# Patient Record
Sex: FEMALE | Race: WHITE | NOT HISPANIC OR LATINO | Employment: FULL TIME | URBAN - METROPOLITAN AREA
[De-identification: names, ages, dates, MRNs, and addresses within clinical notes are randomized per-mention and may not be internally consistent; named-entity substitution may affect disease eponyms.]

---

## 2019-05-01 ENCOUNTER — HOSPITAL ENCOUNTER (OUTPATIENT)
Dept: RADIOLOGY | Facility: MEDICAL CENTER | Age: 44
End: 2019-05-01
Attending: NURSE PRACTITIONER
Payer: COMMERCIAL

## 2019-05-01 ENCOUNTER — TELEPHONE (OUTPATIENT)
Dept: URGENT CARE | Facility: PHYSICIAN GROUP | Age: 44
End: 2019-05-01

## 2019-05-01 ENCOUNTER — OFFICE VISIT (OUTPATIENT)
Dept: URGENT CARE | Facility: PHYSICIAN GROUP | Age: 44
End: 2019-05-01
Payer: COMMERCIAL

## 2019-05-01 VITALS
DIASTOLIC BLOOD PRESSURE: 60 MMHG | RESPIRATION RATE: 16 BRPM | BODY MASS INDEX: 24.84 KG/M2 | HEART RATE: 105 BPM | SYSTOLIC BLOOD PRESSURE: 98 MMHG | TEMPERATURE: 99.1 F | OXYGEN SATURATION: 92 % | WEIGHT: 135 LBS | HEIGHT: 62 IN

## 2019-05-01 DIAGNOSIS — K59.03 DRUG-INDUCED CONSTIPATION: ICD-10-CM

## 2019-05-01 DIAGNOSIS — R10.12 LUQ ABDOMINAL PAIN: ICD-10-CM

## 2019-05-01 DIAGNOSIS — R10.12 LUQ ABDOMINAL PAIN: Primary | ICD-10-CM

## 2019-05-01 LAB
APPEARANCE UR: CLEAR
BILIRUB UR STRIP-MCNC: NORMAL MG/DL
COLOR UR AUTO: NORMAL
GLUCOSE UR STRIP.AUTO-MCNC: NORMAL MG/DL
KETONES UR STRIP.AUTO-MCNC: NORMAL MG/DL
LEUKOCYTE ESTERASE UR QL STRIP.AUTO: NORMAL
NITRITE UR QL STRIP.AUTO: NORMAL
PH UR STRIP.AUTO: 5.5 [PH] (ref 5–8)
PROT UR QL STRIP: NORMAL MG/DL
RBC UR QL AUTO: NORMAL
SP GR UR STRIP.AUTO: 1.01
UROBILINOGEN UR STRIP-MCNC: NORMAL MG/DL

## 2019-05-01 PROCEDURE — 81002 URINALYSIS NONAUTO W/O SCOPE: CPT | Performed by: NURSE PRACTITIONER

## 2019-05-01 PROCEDURE — 74176 CT ABD & PELVIS W/O CONTRAST: CPT

## 2019-05-01 PROCEDURE — 99204 OFFICE O/P NEW MOD 45 MIN: CPT | Performed by: NURSE PRACTITIONER

## 2019-05-01 RX ORDER — NITROFURANTOIN 25; 75 MG/1; MG/1
100 CAPSULE ORAL 2 TIMES DAILY
COMMUNITY
End: 2021-11-09

## 2019-05-01 RX ORDER — TRAZODONE HYDROCHLORIDE 50 MG/1
50 TABLET ORAL NIGHTLY
COMMUNITY
End: 2023-01-04

## 2019-05-01 NOTE — PROGRESS NOTES
"Subjective:      Shae Carrasco is a 43 y.o. female who presents with Abdominal Pain (5 days, severe abdominal pain, nausea, LUQ)            HPI  C/o LUQ abdominal pain. Bladder lift and cervix \"taken off\" in last week. Saw surgeon yesterday. Blood work ordered, due for f/u next week with him, no known results yet, labs went to [facility]. Taking Macrobid for UTI presently, placed on by surgeon. Experiencing no urinary symptoms . Nausea, vomiting before clinic visit today. Denies fever or malaise. Admits to taken narcotic from surgery but has stopped this. Has had bowel movement, no diarrhea.    PMH:  has a past medical history of Other specified symptom associated with female genital organs; Pain; and Psychiatric disorder.  MEDS:   Current Outpatient Prescriptions:   •  nitrofurantoin monohyd macro (MACROBID) 100 MG Cap, Take 100 mg by mouth 2 times a day., Disp: , Rfl:   •  ARIPiprazole (ABILIFY PO), Take  by mouth., Disp: , Rfl:   •  traZODone (DESYREL) 50 MG Tab, Take 50 mg by mouth every evening., Disp: , Rfl:   •  escitalopram (LEXAPRO) 10 MG Tab, Take 1 Tab by mouth every day., Disp: 30 Tab, Rfl: 0  •  lamotrigine (LAMICTAL) 25 MG Tab, Take 2 Tabs by mouth every day., Disp: 60 Tab, Rfl: 0  •  ibuprofen (MOTRIN) 600 MG TABS, Take 600 mg by mouth every 6 hours as needed., Disp: , Rfl:   •  Albuterol (VENTOLIN INH), Inhale  by mouth., Disp: , Rfl:   ALLERGIES: No Known Allergies  SURGHX:   Past Surgical History:   Procedure Laterality Date   • SUPRACERVICAL HYSTERECTOMY SCOPE  6/8/2011    Performed by KLAUDIA DREW at SURGERY SAME DAY [facility] ORS   • DILATION AND CURETTAGE  2004     SOCHX:  reports that she has quit smoking. Her smoking use included Cigarettes. She has a 13.00 pack-year smoking history. She has never used smokeless tobacco. She reports that she does not drink alcohol or use drugs.  FH: Family history was reviewed, no pertinent findings to report    Review of Systems   Constitutional: " "Negative for chills, fever and malaise/fatigue.   Respiratory: Negative for shortness of breath.    Cardiovascular: Negative for chest pain, palpitations and orthopnea.   Gastrointestinal: Positive for abdominal pain, nausea and vomiting. Negative for constipation and diarrhea.   Genitourinary: Negative for dysuria, flank pain, frequency, hematuria and urgency.   Musculoskeletal: Negative for back pain and myalgias.   Neurological: Negative for dizziness, weakness and headaches.   All other systems reviewed and are negative.         Objective:     BP (!) 98/60   Pulse (!) 105   Temp 37.3 °C (99.1 °F) (Temporal)   Resp 16   Ht 1.575 m (5' 2\")   Wt 61.2 kg (135 lb)   LMP 12/01/2010   SpO2 92%   BMI 24.69 kg/m²      Physical Exam   Constitutional: She is oriented to person, place, and time. She appears well-developed and well-nourished. She is active and cooperative.  Non-toxic appearance. She does not have a sickly appearance. She does not appear ill. No distress.   HENT:   Head: Normocephalic.   Eyes: Pupils are equal, round, and reactive to light. Conjunctivae and EOM are normal.   Neck: Normal range of motion. Neck supple.   Cardiovascular: Normal rate, regular rhythm and normal heart sounds.    Pulmonary/Chest: Effort normal and breath sounds normal. No accessory muscle usage. No respiratory distress. She has no decreased breath sounds. She has no wheezes. She has no rhonchi. She has no rales.   Abdominal: Soft. Normal appearance and bowel sounds are normal. She exhibits no distension, no fluid wave, no ascites and no mass. There is no hepatosplenomegaly. There is tenderness in the left upper quadrant and left lower quadrant. There is no rigidity, no rebound, no guarding, no CVA tenderness, no tenderness at McBurney's point and negative Mina's sign. No hernia.       Musculoskeletal: Normal range of motion.   Neurological: She is alert and oriented to person, place, and time. She has normal strength. No " cranial nerve deficit or sensory deficit. Coordination and gait normal. GCS eye subscore is 4. GCS verbal subscore is 5. GCS motor subscore is 6.   Skin: Skin is warm and dry. She is not diaphoretic.   Psychiatric: She has a normal mood and affect. Her speech is normal and behavior is normal. Judgment and thought content normal. She is not actively hallucinating. Cognition and memory are normal. She is attentive.   Vitals reviewed.              Assessment/Plan:     1. LUQ abdominal pain    - CT-RENAL COLIC EVALUATION(A/P W/O); Future  - POCT Urinalysis: bld (already taking Macrobid)    2. Drug-induced constipation    Increase water intake  Recommend daily stool softener with laxative prn like Miralax  Encourage fibrous food intake and balanced diet including fruits and vegetables, decrease sugar and processed foods  May introduce a fiber supplement daily  Monitor for severe abdominal pain with fever and inability to have bowel movement, leslie blood with bowel movement, fever, n/v- need to got to ER, patient understands this  F/u surgeon next week

## 2019-05-04 ASSESSMENT — ENCOUNTER SYMPTOMS
PALPITATIONS: 0
BACK PAIN: 0
CONSTIPATION: 0
HEADACHES: 0
MYALGIAS: 0
NAUSEA: 1
FLANK PAIN: 0
FEVER: 0
CHILLS: 0
VOMITING: 1
DIZZINESS: 0
SHORTNESS OF BREATH: 0
ORTHOPNEA: 0
WEAKNESS: 0
ABDOMINAL PAIN: 1
DIARRHEA: 0

## 2020-10-19 ENCOUNTER — APPOINTMENT (OUTPATIENT)
Dept: URGENT CARE | Facility: PHYSICIAN GROUP | Age: 45
End: 2020-10-19
Payer: COMMERCIAL

## 2021-10-25 ENCOUNTER — TELEPHONE (OUTPATIENT)
Dept: BEHAVIORAL HEALTH | Facility: PSYCHIATRIC FACILITY | Age: 46
End: 2021-10-25

## 2021-10-25 NOTE — TELEPHONE ENCOUNTER
Received request via: Pharmacy    Was the patient seen in the last year in this department? No     Does the patient have an active prescription (recently filled or refills available) for medication(s) requested? No     Pharmacy faxed over refill request for metformin 1000 MG. Pharmacy is [facility] on [address].

## 2021-11-09 ENCOUNTER — OFFICE VISIT (OUTPATIENT)
Dept: BEHAVIORAL HEALTH | Facility: PSYCHIATRIC FACILITY | Age: 46
End: 2021-11-09
Payer: COMMERCIAL

## 2021-11-09 VITALS
HEART RATE: 78 BPM | DIASTOLIC BLOOD PRESSURE: 76 MMHG | BODY MASS INDEX: 26.23 KG/M2 | SYSTOLIC BLOOD PRESSURE: 99 MMHG | WEIGHT: 143.4 LBS

## 2021-11-09 DIAGNOSIS — F31.9 BIPOLAR AFFECTIVE DISORDER, REMISSION STATUS UNSPECIFIED (HCC): ICD-10-CM

## 2021-11-09 DIAGNOSIS — Z00.00 HEALTH CARE MAINTENANCE: Primary | ICD-10-CM

## 2021-11-09 PROCEDURE — 99213 OFFICE O/P EST LOW 20 MIN: CPT | Mod: GE | Performed by: STUDENT IN AN ORGANIZED HEALTH CARE EDUCATION/TRAINING PROGRAM

## 2021-11-09 RX ORDER — ARIPIPRAZOLE 10 MG/1
TABLET ORAL
COMMUNITY
Start: 2021-11-05 | End: 2021-12-14 | Stop reason: SDUPTHER

## 2021-11-09 RX ORDER — ALBUTEROL SULFATE 90 UG/1
AEROSOL, METERED RESPIRATORY (INHALATION)
COMMUNITY

## 2021-11-09 RX ORDER — LAMOTRIGINE 25 MG/1
3 TABLET ORAL
COMMUNITY
End: 2022-03-22 | Stop reason: SDUPTHER

## 2021-11-09 RX ORDER — LAMOTRIGINE 100 MG/1
TABLET ORAL
COMMUNITY
Start: 2021-10-20 | End: 2022-03-22 | Stop reason: SDUPTHER

## 2021-11-09 RX ORDER — ARIPIPRAZOLE 5 MG/1
TABLET ORAL
COMMUNITY
Start: 2021-10-17 | End: 2021-11-10

## 2021-11-09 RX ORDER — ESCITALOPRAM OXALATE 20 MG/1
1 TABLET ORAL
COMMUNITY
End: 2021-11-09

## 2021-11-09 RX ORDER — BUPROPION HYDROCHLORIDE 150 MG/1
150 TABLET ORAL EVERY MORNING
Qty: 90 TABLET | Refills: 1 | Status: SHIPPED | OUTPATIENT
Start: 2021-11-09 | End: 2021-12-14
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
## 2021-11-10 RX ORDER — ARIPIPRAZOLE 5 MG/1
TABLET ORAL
Qty: 15 TABLET | Refills: 2 | Status: SHIPPED | OUTPATIENT
Start: 2021-11-10 | End: 2021-12-14 | Stop reason: SDUPTHER

## 2021-12-14 ENCOUNTER — OFFICE VISIT (OUTPATIENT)
Dept: BEHAVIORAL HEALTH | Facility: PSYCHIATRIC FACILITY | Age: 46
End: 2021-12-14
Payer: COMMERCIAL

## 2021-12-14 DIAGNOSIS — Z00.00 HEALTHCARE MAINTENANCE: Primary | ICD-10-CM

## 2021-12-14 DIAGNOSIS — F41.1 GENERALIZED ANXIETY DISORDER: ICD-10-CM

## 2021-12-14 DIAGNOSIS — F31.9 BIPOLAR AFFECTIVE DISORDER, REMISSION STATUS UNSPECIFIED (HCC): ICD-10-CM

## 2021-12-14 PROCEDURE — 99213 OFFICE O/P EST LOW 20 MIN: CPT | Mod: GE | Performed by: STUDENT IN AN ORGANIZED HEALTH CARE EDUCATION/TRAINING PROGRAM

## 2021-12-14 RX ORDER — BUPROPION HYDROCHLORIDE 150 MG/1
150 TABLET ORAL EVERY MORNING
Qty: 30 TABLET | Refills: 2 | Status: SHIPPED | OUTPATIENT
Start: 2021-12-14 | End: 2022-01-18

## 2021-12-14 RX ORDER — ARIPIPRAZOLE 5 MG/1
2.5 TABLET ORAL DAILY
Qty: 15 TABLET | Refills: 2 | Status: SHIPPED | OUTPATIENT
Start: 2021-12-14 | End: 2022-03-14

## 2021-12-14 RX ORDER — ARIPIPRAZOLE 10 MG/1
10 TABLET ORAL DAILY
Qty: 30 TABLET | Refills: 2 | Status: SHIPPED | OUTPATIENT
Start: 2021-12-14 | End: 2022-03-22
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
## 2022-01-18 ENCOUNTER — TELEMEDICINE (OUTPATIENT)
Dept: BEHAVIORAL HEALTH | Facility: PSYCHIATRIC FACILITY | Age: 47
End: 2022-01-18
Payer: COMMERCIAL

## 2022-01-18 DIAGNOSIS — F31.9 BIPOLAR AFFECTIVE DISORDER, REMISSION STATUS UNSPECIFIED (HCC): ICD-10-CM

## 2022-01-18 DIAGNOSIS — F41.1 GENERALIZED ANXIETY DISORDER: ICD-10-CM

## 2022-01-18 PROCEDURE — 99213 OFFICE O/P EST LOW 20 MIN: CPT | Mod: GT,GE | Performed by: STUDENT IN AN ORGANIZED HEALTH CARE EDUCATION/TRAINING PROGRAM

## 2022-01-18 RX ORDER — BUPROPION HYDROCHLORIDE 300 MG/1
300 TABLET ORAL EVERY MORNING
Qty: 30 TABLET | Refills: 2 | Status: SHIPPED | OUTPATIENT
Start: 2022-01-18 | End: 2022-04-12
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
## 2022-01-19 ASSESSMENT — ENCOUNTER SYMPTOMS
WEAKNESS: 0
SPUTUM PRODUCTION: 0
FEVER: 0
DIARRHEA: 0
DIZZINESS: 0
PALPITATIONS: 0
FLANK PAIN: 0
VOMITING: 0
FALLS: 0
DOUBLE VISION: 0
BRUISES/BLEEDS EASILY: 0
ORTHOPNEA: 0
HEADACHES: 0
HEMOPTYSIS: 0
BLURRED VISION: 0
COUGH: 0
HEARTBURN: 0
MYALGIAS: 0
PHOTOPHOBIA: 0
NAUSEA: 0
CHILLS: 0

## 2022-03-22 ENCOUNTER — OFFICE VISIT (OUTPATIENT)
Dept: BEHAVIORAL HEALTH | Facility: PSYCHIATRIC FACILITY | Age: 47
End: 2022-03-22
Payer: COMMERCIAL

## 2022-03-22 DIAGNOSIS — F31.9 BIPOLAR AFFECTIVE DISORDER, REMISSION STATUS UNSPECIFIED (HCC): ICD-10-CM

## 2022-03-22 DIAGNOSIS — F41.1 GENERALIZED ANXIETY DISORDER: ICD-10-CM

## 2022-03-22 PROCEDURE — 99214 OFFICE O/P EST MOD 30 MIN: CPT | Performed by: STUDENT IN AN ORGANIZED HEALTH CARE EDUCATION/TRAINING PROGRAM

## 2022-03-22 RX ORDER — LAMOTRIGINE 25 MG/1
25 TABLET ORAL DAILY
Qty: 30 TABLET | Refills: 3 | Status: SHIPPED | OUTPATIENT
Start: 2022-03-22 | End: 2022-05-24 | Stop reason: SDUPTHER

## 2022-03-22 RX ORDER — TRAZODONE HYDROCHLORIDE 50 MG/1
50 TABLET ORAL NIGHTLY PRN
Qty: 30 TABLET | Refills: 3 | Status: SHIPPED | OUTPATIENT
Start: 2022-03-22 | End: 2022-07-20 | Stop reason: SDUPTHER

## 2022-03-22 RX ORDER — ARIPIPRAZOLE 10 MG/1
10 TABLET ORAL DAILY
Qty: 30 TABLET | Refills: 3 | Status: SHIPPED | OUTPATIENT
Start: 2022-03-22 | End: 2022-07-20 | Stop reason: SDUPTHER

## 2022-03-22 RX ORDER — LAMOTRIGINE 100 MG/1
100 TABLET ORAL DAILY
Qty: 30 TABLET | Refills: 3 | Status: SHIPPED | OUTPATIENT
Start: 2022-03-22 | End: 2022-05-24 | Stop reason: SDUPTHER

## 2022-03-22 RX ORDER — ARIPIPRAZOLE 5 MG/1
2.5 TABLET ORAL DAILY
Qty: 30 TABLET | Refills: 3 | Status: SHIPPED | OUTPATIENT
Start: 2022-03-22 | End: 2022-07-20 | Stop reason: SDUPTHER
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
## 2022-04-12 RX ORDER — BUPROPION HYDROCHLORIDE 300 MG/1
TABLET ORAL
Qty: 30 TABLET | Refills: 2 | Status: SHIPPED | OUTPATIENT
Start: 2022-04-12 | End: 2022-05-24

## 2022-05-24 ENCOUNTER — OFFICE VISIT (OUTPATIENT)
Dept: BEHAVIORAL HEALTH | Facility: PSYCHIATRIC FACILITY | Age: 47
End: 2022-05-24
Payer: COMMERCIAL

## 2022-05-24 DIAGNOSIS — F41.1 GENERALIZED ANXIETY DISORDER: ICD-10-CM

## 2022-05-24 DIAGNOSIS — F31.9 BIPOLAR AFFECTIVE DISORDER, REMISSION STATUS UNSPECIFIED (HCC): ICD-10-CM

## 2022-05-24 PROCEDURE — 99213 OFFICE O/P EST LOW 20 MIN: CPT | Mod: GE | Performed by: STUDENT IN AN ORGANIZED HEALTH CARE EDUCATION/TRAINING PROGRAM

## 2022-05-24 RX ORDER — BUPROPION HYDROCHLORIDE 150 MG/1
150 TABLET ORAL EVERY MORNING
Qty: 30 TABLET | Refills: 3 | Status: SHIPPED | OUTPATIENT
Start: 2022-05-24 | End: 2022-06-21

## 2022-05-24 RX ORDER — LAMOTRIGINE 25 MG/1
25 TABLET ORAL DAILY
Qty: 30 TABLET | Refills: 3 | Status: SHIPPED | OUTPATIENT
Start: 2022-05-24 | End: 2022-07-20 | Stop reason: SDUPTHER

## 2022-05-24 RX ORDER — LAMOTRIGINE 100 MG/1
100 TABLET ORAL DAILY
Qty: 30 TABLET | Refills: 3 | Status: SHIPPED | OUTPATIENT
Start: 2022-05-24 | End: 2022-07-20 | Stop reason: SDUPTHER
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
## 2022-06-21 ENCOUNTER — OFFICE VISIT (OUTPATIENT)
Dept: BEHAVIORAL HEALTH | Facility: PSYCHIATRIC FACILITY | Age: 47
End: 2022-06-21
Payer: COMMERCIAL

## 2022-06-21 VITALS
BODY MASS INDEX: 27.42 KG/M2 | HEIGHT: 62 IN | WEIGHT: 149 LBS | SYSTOLIC BLOOD PRESSURE: 118 MMHG | HEART RATE: 89 BPM | DIASTOLIC BLOOD PRESSURE: 72 MMHG

## 2022-06-21 DIAGNOSIS — F41.1 GENERALIZED ANXIETY DISORDER: ICD-10-CM

## 2022-06-21 DIAGNOSIS — F31.9 BIPOLAR AFFECTIVE DISORDER, REMISSION STATUS UNSPECIFIED (HCC): ICD-10-CM

## 2022-06-21 PROCEDURE — 99213 OFFICE O/P EST LOW 20 MIN: CPT | Mod: GE | Performed by: STUDENT IN AN ORGANIZED HEALTH CARE EDUCATION/TRAINING PROGRAM

## 2022-06-21 RX ORDER — BUPROPION HYDROCHLORIDE 150 MG/1
150 TABLET ORAL EVERY MORNING
Qty: 30 TABLET | Status: CANCELLED | OUTPATIENT
Start: 2022-06-21

## 2022-06-21 RX ORDER — ESCITALOPRAM OXALATE 20 MG/1
20 TABLET ORAL DAILY
Qty: 30 TABLET | Refills: 3 | Status: SHIPPED | OUTPATIENT
Start: 2022-06-21 | End: 2022-07-20 | Stop reason: SDUPTHER

## 2022-06-21 RX ORDER — BUPROPION HYDROCHLORIDE 75 MG/1
75 TABLET ORAL DAILY
Qty: 30 TABLET | Refills: 3 | Status: SHIPPED | OUTPATIENT
Start: 2022-06-21 | End: 2022-07-20 | Stop reason: SDUPTHER
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
## 2022-07-13 ENCOUNTER — APPOINTMENT (OUTPATIENT)
Dept: BEHAVIORAL HEALTH | Facility: PSYCHIATRIC FACILITY | Age: 47
End: 2022-07-13
Payer: COMMERCIAL

## 2022-07-20 RX ORDER — TRAZODONE HYDROCHLORIDE 50 MG/1
50 TABLET ORAL NIGHTLY PRN
Qty: 30 TABLET | Refills: 3 | Status: SHIPPED | OUTPATIENT
Start: 2022-07-20 | End: 2023-01-04 | Stop reason: SDUPTHER

## 2022-07-20 RX ORDER — BUPROPION HYDROCHLORIDE 75 MG/1
75 TABLET ORAL DAILY
Qty: 30 TABLET | Refills: 3 | Status: SHIPPED | OUTPATIENT
Start: 2022-07-20 | End: 2022-11-02 | Stop reason: SDUPTHER

## 2022-07-20 RX ORDER — ARIPIPRAZOLE 10 MG/1
10 TABLET ORAL DAILY
Qty: 30 TABLET | Refills: 3 | Status: SHIPPED | OUTPATIENT
Start: 2022-07-20 | End: 2022-11-02 | Stop reason: SDUPTHER

## 2022-07-20 RX ORDER — LAMOTRIGINE 100 MG/1
100 TABLET ORAL DAILY
Qty: 30 TABLET | Refills: 3 | Status: SHIPPED | OUTPATIENT
Start: 2022-07-20 | End: 2022-11-02 | Stop reason: SDUPTHER

## 2022-07-20 RX ORDER — LAMOTRIGINE 25 MG/1
25 TABLET ORAL DAILY
Qty: 30 TABLET | Refills: 3 | Status: SHIPPED | OUTPATIENT
Start: 2022-07-20 | End: 2022-11-02 | Stop reason: SDUPTHER

## 2022-07-20 RX ORDER — ARIPIPRAZOLE 5 MG/1
2.5 TABLET ORAL DAILY
Qty: 30 TABLET | Refills: 3 | Status: SHIPPED | OUTPATIENT
Start: 2022-07-20 | End: 2022-11-02 | Stop reason: SDUPTHER

## 2022-07-20 RX ORDER — ESCITALOPRAM OXALATE 20 MG/1
20 TABLET ORAL DAILY
Qty: 30 TABLET | Refills: 3 | Status: SHIPPED | OUTPATIENT
Start: 2022-07-20 | End: 2022-11-02

## 2022-08-24 ENCOUNTER — OFFICE VISIT (OUTPATIENT)
Dept: BEHAVIORAL HEALTH | Facility: PSYCHIATRIC FACILITY | Age: 47
End: 2022-08-24
Payer: COMMERCIAL

## 2022-08-24 DIAGNOSIS — F41.1 GENERALIZED ANXIETY DISORDER: ICD-10-CM

## 2022-08-24 DIAGNOSIS — F31.9 BIPOLAR AFFECTIVE DISORDER, REMISSION STATUS UNSPECIFIED (HCC): ICD-10-CM

## 2022-08-24 PROCEDURE — 99213 OFFICE O/P EST LOW 20 MIN: CPT | Mod: GE | Performed by: STUDENT IN AN ORGANIZED HEALTH CARE EDUCATION/TRAINING PROGRAM

## 2022-08-24 ASSESSMENT — ENCOUNTER SYMPTOMS
HOPELESSNESS: 0
FREQUENT AWAKENING: 0
HIGH ENERGY LEVEL: 0
DECREASED NEED FOR SLEEP: 0
DECREASED APPETITE: 0
PANIC: 0
COMPULSIONS: 0
DELUSIONS: 0
TRAUMA MANIFESTATION - EXHIBITS INCREASED AROUSAL: 0
PARANOIA: 0
DIFFICULTY FALLING ASLEEP: 0

## 2022-08-24 ASSESSMENT — SOCIAL DETERMINANTS OF HEALTH (SDOH): ANXIETY ASSOCIATED WITH SOCIAL SUPPORT SYSTEM: 0
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
## 2022-10-05 ENCOUNTER — OFFICE VISIT (OUTPATIENT)
Dept: BEHAVIORAL HEALTH | Facility: PSYCHIATRIC FACILITY | Age: 47
End: 2022-10-05
Payer: COMMERCIAL

## 2022-10-05 VITALS
BODY MASS INDEX: 31.1 KG/M2 | HEIGHT: 62 IN | WEIGHT: 169 LBS | DIASTOLIC BLOOD PRESSURE: 69 MMHG | SYSTOLIC BLOOD PRESSURE: 124 MMHG | OXYGEN SATURATION: 97 % | HEART RATE: 231 BPM

## 2022-10-05 DIAGNOSIS — F31.9 BIPOLAR AFFECTIVE DISORDER, REMISSION STATUS UNSPECIFIED (HCC): ICD-10-CM

## 2022-10-05 PROCEDURE — 99213 OFFICE O/P EST LOW 20 MIN: CPT | Mod: GE | Performed by: STUDENT IN AN ORGANIZED HEALTH CARE EDUCATION/TRAINING PROGRAM

## 2022-10-05 ASSESSMENT — ENCOUNTER SYMPTOMS
DIFFICULTY FALLING ASLEEP: 0
COMPULSIONS: 0
DELUSIONS: 0
HIGH ENERGY LEVEL: 0
DECREASED NEED FOR SLEEP: 0
TRAUMA MANIFESTATION - EXHIBITS INCREASED AROUSAL: 0
WEIGHT GAIN: 1
DECREASED APPETITE: 0
PANIC: 0
HOPELESSNESS: 0
FREQUENT AWAKENING: 0
PARANOIA: 0

## 2022-10-05 ASSESSMENT — SOCIAL DETERMINANTS OF HEALTH (SDOH): ANXIETY ASSOCIATED WITH SOCIAL SUPPORT SYSTEM: 0
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
## 2022-11-02 ENCOUNTER — OFFICE VISIT (OUTPATIENT)
Dept: BEHAVIORAL HEALTH | Facility: PSYCHIATRIC FACILITY | Age: 47
End: 2022-11-02
Payer: COMMERCIAL

## 2022-11-02 DIAGNOSIS — F31.9 BIPOLAR AFFECTIVE DISORDER, REMISSION STATUS UNSPECIFIED (HCC): ICD-10-CM

## 2022-11-02 PROCEDURE — 99213 OFFICE O/P EST LOW 20 MIN: CPT | Mod: GE | Performed by: STUDENT IN AN ORGANIZED HEALTH CARE EDUCATION/TRAINING PROGRAM

## 2022-11-02 RX ORDER — LAMOTRIGINE 100 MG/1
100 TABLET ORAL DAILY
Qty: 30 TABLET | Refills: 3 | Status: SHIPPED | OUTPATIENT
Start: 2022-11-02 | End: 2023-01-04 | Stop reason: SDUPTHER

## 2022-11-02 RX ORDER — ARIPIPRAZOLE 10 MG/1
10 TABLET ORAL DAILY
Qty: 30 TABLET | Refills: 3 | Status: SHIPPED | OUTPATIENT
Start: 2022-11-02 | End: 2023-01-04 | Stop reason: SDUPTHER

## 2022-11-02 RX ORDER — ARIPIPRAZOLE 5 MG/1
2.5 TABLET ORAL DAILY
Qty: 30 TABLET | Refills: 3 | Status: SHIPPED | OUTPATIENT
Start: 2022-11-02 | End: 2023-01-04 | Stop reason: SDUPTHER

## 2022-11-02 RX ORDER — BUPROPION HYDROCHLORIDE 75 MG/1
75 TABLET ORAL DAILY
Qty: 30 TABLET | Refills: 3 | Status: SHIPPED | OUTPATIENT
Start: 2022-11-02 | End: 2023-01-04 | Stop reason: SDUPTHER

## 2022-11-02 RX ORDER — LAMOTRIGINE 25 MG/1
25 TABLET ORAL DAILY
Qty: 30 TABLET | Refills: 3 | Status: SHIPPED | OUTPATIENT
Start: 2022-11-02 | End: 2023-01-04 | Stop reason: SDUPTHER

## 2022-11-02 ASSESSMENT — ENCOUNTER SYMPTOMS
TRAUMA MANIFESTATION - EXHIBITS INCREASED AROUSAL: 0
WEAKNESS: 0
VOMITING: 0
DECREASED APPETITE: 0
DECREASED NEED FOR SLEEP: 0
FEVER: 0
HIGH ENERGY LEVEL: 0
HEADACHES: 0
HOPELESSNESS: 0
NERVOUS/ANXIOUS: 0
BLURRED VISION: 0
COUGH: 0
DELUSIONS: 0
PARANOIA: 0
DOUBLE VISION: 0
TREMORS: 1
COMPULSIONS: 0
SEIZURES: 0
MYALGIAS: 0
DIZZINESS: 0
NAUSEA: 0
DEPRESSION: 0
WEIGHT GAIN: 1
CHILLS: 0
SENSORY CHANGE: 0
FREQUENT AWAKENING: 0
PANIC: 0
HEARTBURN: 0
TINGLING: 1
DIFFICULTY FALLING ASLEEP: 0
HALLUCINATIONS: 0

## 2022-11-02 ASSESSMENT — SOCIAL DETERMINANTS OF HEALTH (SDOH): ANXIETY ASSOCIATED WITH SOCIAL SUPPORT SYSTEM: 0

## 2022-11-02 NOTE — PROGRESS NOTES
[facility]  Medication Management Note    Evaluation completed by: Saige Payne D.O.  Date of Service: 11/2/22   Appointment type: in-office appointment.  Information below was collected from: patient  Special language or communication needs: No  Responded to any questions about patient rights: Yes  Reviewed limits of confidentiality: Yes  Confidentiality: The patient was informed that her medical records are confidential except for use by the treatment team in this clinic and others involved in her care.  Records may be shared with outside entities if the patient signs a release of information.  Information may be shared with appropriate authorities without a release of information to report instances of child/elder abuse or if it is determined she is in imminent risk of harm to self or others.     CHIEF COMPLAINT/REASON FOR VISIT  Medication Follow Up    HISTORY OF PRESENT ILLNESS  Shae Carrasco is a 46 y.o. old female who presents today with history of bipolar disorder type 1 and anxiety.  Pt was last seen last month at which time Wellbutrin XL 75mg (which was decreased and continued at decreased dose), abilify 12.5mg, lamotrigine 125mg, and trazodone 50mg QHS PRN. Patient was previously struggling with increased anxiety, now reports that has been gone since decreasing the Wellbutrin.  Patient was previously dealing with sexual side effects of being unable to reach climax/delayed climax from the Lexapro, she decided to self wean off the medication - stated that she cut in half (10 mg to 5 mg) for 2 weeks and then cut it in a quarter (10 mg to 2.5 mg) for a week.  She has not experienced any withdrawal side effects.    She reports continued bilateral mild hand tremors about every other day for the past couple of weeks when she wakes up that is unrelated to anxiety, and she usually notices it first in the morning and seldomly in the afternoon 1-2x/wk since 9/22. She denies  "any other associated symptoms sx such as pain, headache, trauma, head tremor.  She forgot to mention last appointment that she has had chronic numbness and tingling. She is unaware of family hx due to being adopted. She would like to remain on her medications currently and is planning on making an appointment with her PCP (Dr. Dias, independent [facility]) and potential.    Bipolar disorder type I  -Denies symptoms of jose including decreased need for sleep (sleeping 8 hours), impulsivity, distractibility, grandiosity, increased risk-taking behaviors, racing thoughts  -Denies paranoia (previously was very paranoid about  cheating and boss was going to fire her)  -Last manic episode was in early 2000's  -Denies AH/VH  -Denies all suicidal or homicidal ideation  -Reports positive mood with good stability  -Reports good sleep  -Denies symptoms of akathisia    Anxiety  -Reports that she no longer has anxiety after decreasing Wellbutrin XL from 150 mg to 75 mg, and that it is helps with distractibility   -Denies panic attacks        PSYCHOSOCIAL CHANGES SINCE LAST VISIT   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 They have had a successful grand opening which she is very relieved about as they invested a lot of money into the business.            CURRENT MEDICATIONS, ADHERENCE, AND SIDE EFFECTS   Wellbutrin XL 75 mg p.o. QAM daily-good adherence, no longer experiencing increased anxiety  Lexapro 10mg QAM daily-good adherence, less of a problem experiencing organism after adding Wellbutrin (patient weaned off-see HPI)    Abilify 12.5 mg p.o. QAM daily-good adherence, weight gain  Lamictal 125 mg p.o. QHS daily- good adherence, sleepiness    Trazodone 50 mg p.o. as needed nightly (rarely uses)    Metformin 1000 mg twice daily-good adherence, no side effects    PSYCHIATRIC REVIEW OF SYSTEMS:      MOOD SYMPTOMS:   Pertinent " negatives - not sad, no mood swings, not irritable, no mood changes, no grandiosity and not apathetic      ANXIETY:   Pertinent positives - depression/anxiety affecting progress    Pertinent negatives - no excessive worry, no anxiety and no panic    SLEEP:   Pertinent negatives - no difficulty falling asleep and no decreased need for sleep     PSYCHOMOTOR/ENERGY:   Pertinent negatives - not hypoactive, energy level not high, does not exhibit increased arousal and does not exhibit intense distress when reminded of event    EATING:   Pertinent positives: increased appetite and weight gain    Pertinent negatives: no decreased appetite    COGNITIVE:   Pertinent negatives: no obsessions, no compulsions and no hopelessness     BEHAVIOR:   Pertinent negatives - no risky behavior    PSYCHOSIS:   Pertinent negatives - auditory hallucinations, visual hallucinations, delusions and paranoia  SOCIAL:   Pertinent negatives - no history of withdrawal symptoms    SENSORY:            MEDICAL REVIEW OF SYSTEMS  Review of Systems   Constitutional:  Positive for weight gain. Negative for chills, decreased appetite and fever.   HENT:  Negative for hearing loss.    Eyes:  Negative for blurred vision and double vision.   Respiratory:  Negative for cough.    Cardiovascular:  Negative for chest pain.   Gastrointestinal:  Negative for heartburn, nausea and vomiting.   Musculoskeletal:  Negative for myalgias.   Neurological:  Positive for tingling and tremors. Negative for dizziness, sensory change, seizures, weakness and headaches.   Psychiatric/Behavioral:  Negative for depression, hallucinations, paranoia and suicidal ideas. The patient is not nervous/anxious.         CURRENT MEDICATIONS  Current Outpatient Medications on File Prior to Visit   Medication Sig Dispense Refill    metformin (GLUCOPHAGE) 1000 MG tablet TAKE 1 TABLET BY MOUTH TWICE A DAY 60 Tablet 2    escitalopram (LEXAPRO) 20 MG tablet Take 1 Tablet by mouth every day. Take  1/2 tab daily for 10 days. Take 1 full tab daily thereafter. 30 Tablet 3    buPROPion (WELLBUTRIN) 75 MG Tab Take 1 Tablet by mouth every day. 30 Tablet 3    lamoTRIgine (LAMICTAL) 100 MG Tab Take 1 Tablet by mouth every day. 30 Tablet 3    lamoTRIgine (LAMICTAL) 25 MG Tab Take 1 Tablet by mouth every day. 30 Tablet 3    ARIPiprazole (ABILIFY) 10 MG Tab Take 1 Tablet by mouth every day. 30 Tablet 3    ARIPiprazole (ABILIFY) 5 MG tablet Take 0.5 Tablets by mouth every day. 30 Tablet 3    traZODone (DESYREL) 50 MG Tab Take 1 Tablet by mouth at bedtime as needed for Sleep (as needed for sleep). Take 1/2 to 1 tablet by mouth at bedtime. 30 Tablet 3    albuterol 108 (90 Base) MCG/ACT Aero Soln inhalation aerosol inhale 2 puff by inhalation route  every 4 - 6 hours as needed as needed      traZODone (DESYREL) 50 MG Tab Take 50 mg by mouth every evening.      ibuprofen (MOTRIN) 600 MG TABS Take 600 mg by mouth every 6 hours as needed.      Albuterol (VENTOLIN INH) Inhale  by mouth.       No current facility-administered medications on file prior to visit.        ALLERGIES  No Known Allergies     PAST PSYCHIATRIC HISTORY  -Diagnosed with bipolar I in the early 2000's after having manic symptoms consisting of poor sleep, distractibility, unusual behaviors (excessive shopping, hypersexuality, picking fights with ) and racing thoughts for 4-7 days. Potentially would happen once a month for about a year before seeing a psychiatrist - she believes that this started occurring after she started getting clean and sober from drug abuse in which her and her  decided to get clean simultaneously together.     -Hospitalizations: 1x in [place], 20 years ago    -Suicide Attempts: multiple, most recent 3 years ago, usually OD/cuts     -Outpatient Psychiatrist: 3-4 years in this clinic   -Therapist: none     -Past Medications/Therapies:   Depakote - made legs shake   Been on Abilify since early 2000's  Primary care physician added  "Lamictal 2022  Dr. 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 She is not aware a family history or psychiatric illness or suicide attempt.  Pt is aware but not in touch with biological mom and dad, but to her knowledge they do not have any.    FAMILY MEDICAL HISTORY  Patient is adopted, no known family medical history    PHYSICAL EXAMINATION  Vital signs: LMP 12/01/2010 , WT: 149 lbs (144 last visit)  Musculoskeletal: Gait is normal. No gross abnormalities noted.   Abnormal movements: None    MENTAL STATUS EXAMINATION    General: Shae Carrasco appears stated age and exhibits grooming which is appropriate, casual and neat.  Hygiene is appropriate. Pt has sleeve tattoos   Behavior: Pt is calm and cooperative with interview.  No apparent distress.  Eye contact is appropriate.   Psychomotor: Psychomotor agitation or retardation not noted.  Tics or tremors not noted.  Speech: rate within normal limits  Language: Fluent in English  Mood: \"Doing really good\"  Affect: Congruent with content and Anxious,  Thought Process: " Logical and Goal-directed  Thought Content: denies suicidal ideation, denies homicidal ideation. Within normal limits  Perception: denies auditory hallucinations, denies visual hallucinations. No delusions noted on interview.  Attention span and concentration: Normal attention and concentration  Orientation: Alert and Fully Oriented  Recent and remote memory: No gross evidence of memory deficits, Recent:  Good and Remote:  Good  Insight: Good  Judgment: Good    SAFETY ASSESSMENT - RISK TO SELF  Current suicide attempts or self harm: No  Past suicide attempts or self harm: Yes  History of suicide by family member: No  History of suicide by friend/significant other: No  Recent change in amount/specificity/intensity of suicidal thoughts or self-harm behavior: No  Ongoing substance use disorder: No  Current access to firearms, medications, or other identified means of suicide/self-harm: No  If yes, willing to restrict access to means of suicide/self-harm: N/a  Protective factors present: Yes     SAFETY ASSESSMENT - RISK TO OTHERS  Current aggressive behavior or risk to others: No  Past aggressive behavior or risk to others: No  Recent change in amount/specificity/intensity of thoughts or threats to harm others? No  Current access to firearms/other identified means of harm? No  If yes, willing to restrict access to weapons/means of harm? N/a     CURRENT RISK ASSESSMENT       Suicide: Low       Homicide: Low       Self-Harm: Low       Relapse: Not applicable       Crisis Safety Plan Reviewed Yes    [place]  records  Not indicated    ASSESSMENT  Shae Carrsaco is a 47 y.o. old female presenting for follow up management of bipolar 1 disorder. All mood symptoms are well managed with her current medication regimen of Abilify and lamotrigine. Previously restarted Lexapro 10mg daily has been self-discontinued by patient without adverse effect of mood or withdrawal symptoms. Pt chose to discontinue lexapro due to continued  sexual side effects despite decreased dosage and addition of Wellbutrin.  Patient instructed to monitor mood and educated that patient is able to contact provider via [employer] messaging anytime.    Her last labs were taken 1 year ago.  Last appointment CBC w diff, CMP, HA1C, lipid panel, TSH+free T4 has been ordered, however patient did not get blood work done then. Patient will be seeing primary care for bilateral hand tremors, numbness and tingling.  Primary care Dr. Dias is not a part of [facility] and will be getting patient's labs done.       DIAGNOSES/PLAN  Problem 1: Bipolar I Disorder   Medications:   Abilify 12.5 mg p.o. daily  lamotrigine 125 mg p.o. daily  Trazodone 50 mg p.o. as needed nightly  Psychotherapy: Not interested at this time  Labs/studies:  As above     Problem 2: Anxiety  Medications:    Wellbutrin XL 75 mg p.o. daily to augment sexual s/e of lexapro  Discontinued: Lexapro 10mg po daily for anxiety and depression  Labs/studies: As above  Other: Patient provided with handout on sleep hygiene    Medication options, alternatives (including no medications) and medication risks/benefits/side effects were discussed in detail.  The patient was advised to call, message clinician on [employer], or come in to the clinic if symptoms worsen or if questions/issues regarding their medications arise.  The patient verbalized understanding and agreement.    The patient was educated to call 911, call the suicide hotline, or go to the local ER if having thoughts of suicide or homicide.  The patient verbalized understanding and agreement.   The proposed treatment plan was discussed with the patient who was provided the opportunity to ask questions and make suggestions regarding alternative treatment. Patient verbalized understanding and expressed agreement with the plan.      Return to clinic in 1 month or sooner if symptoms worsen.    This appointment was supervised by attending psychiatrist, Maria A Moya MD, who  agrees with assessment and treatment plan.  See attending attestation for more details.       Saige Payne D.O.  11/2/22

## 2022-12-07 ENCOUNTER — OFFICE VISIT (OUTPATIENT)
Dept: BEHAVIORAL HEALTH | Facility: PSYCHIATRIC FACILITY | Age: 47
End: 2022-12-07
Payer: COMMERCIAL

## 2022-12-07 VITALS
DIASTOLIC BLOOD PRESSURE: 66 MMHG | OXYGEN SATURATION: 94 % | HEART RATE: 92 BPM | HEIGHT: 62 IN | BODY MASS INDEX: 32.76 KG/M2 | SYSTOLIC BLOOD PRESSURE: 120 MMHG | WEIGHT: 178 LBS

## 2022-12-07 DIAGNOSIS — F41.1 GENERALIZED ANXIETY DISORDER: ICD-10-CM

## 2022-12-07 DIAGNOSIS — F31.9 BIPOLAR AFFECTIVE DISORDER, REMISSION STATUS UNSPECIFIED (HCC): ICD-10-CM

## 2022-12-07 PROCEDURE — 99213 OFFICE O/P EST LOW 20 MIN: CPT | Mod: GE | Performed by: STUDENT IN AN ORGANIZED HEALTH CARE EDUCATION/TRAINING PROGRAM

## 2022-12-07 RX ORDER — ESCITALOPRAM OXALATE 10 MG/1
10 TABLET ORAL EVERY MORNING
Qty: 31 TABLET | Refills: 3 | Status: SHIPPED | OUTPATIENT
Start: 2022-12-07 | End: 2023-01-04 | Stop reason: SDUPTHER

## 2022-12-07 ASSESSMENT — ENCOUNTER SYMPTOMS
BLURRED VISION: 0
PARANOIA: 0
HEARTBURN: 0
DECREASED ENERGY: 0
FREQUENT AWAKENING: 0
DOUBLE VISION: 0
TINGLING: 0
DEPRESSION: 0
NAUSEA: 0
INCREASED ENERGY: 0
PANIC: 0
TRAUMA MANIFESTATION - EXHIBITS INCREASED AROUSAL: 0
WEAKNESS: 0
SENSORY CHANGE: 0
HOPELESSNESS: 0
FEVER: 0
DIZZINESS: 0
DELUSIONS: 0
NERVOUS/ANXIOUS: 0
COMPULSIONS: 0
TREMORS: 0
DIFFICULTY FALLING ASLEEP: 0
VOMITING: 0
COUGH: 0
DECREASED NEED FOR SLEEP: 0
PSYCHOMOTOR AGITATION: 0
WEIGHT GAIN: 1
SEIZURES: 0
HEADACHES: 0
CHILLS: 0
MYALGIAS: 0
HALLUCINATIONS: 0
DECREASED APPETITE: 0

## 2022-12-07 ASSESSMENT — SOCIAL DETERMINANTS OF HEALTH (SDOH): ANXIETY ASSOCIATED WITH SOCIAL SUPPORT SYSTEM: 0

## 2022-12-07 NOTE — PROGRESS NOTES
[facility]  Medication Management Note    Evaluation completed by: Saige Payne D.O.  Date of Service: 12/7/22   Appointment type: in-office appointment.  Information below was collected from: patient  Special language or communication needs: No  Responded to any questions about patient rights: Yes  Reviewed limits of confidentiality: Yes  Confidentiality: The patient was informed that her medical records are confidential except for use by the treatment team in this clinic and others involved in her care.  Records may be shared with outside entities if the patient signs a release of information.  Information may be shared with appropriate authorities without a release of information to report instances of child/elder abuse or if it is determined she is in imminent risk of harm to self or others.     CHIEF COMPLAINT/REASON FOR VISIT  Medication Follow Up    HISTORY OF PRESENT ILLNESS  Shae Carrasco is a 46 y.o. old female who presents today with history of bipolar disorder type 1 and anxiety.  Pt was last seen last month at which time Wellbutrin XL 75mg (which was decreased and continued at decreased dose), abilify 12.5mg, lamotrigine 125mg, and trazodone 50mg QHS PRN.     Previously- patient was struggling with increased anxiety, now reports that has been gone since decreasing the Wellbutrin.  Patient was previously dealing with sexual side effects of being unable to reach climax/delayed climax from the Lexapro, she decided to self wean off the medication - stated that she cut in half (10 mg to 5 mg) for 2 weeks and then cut it in a quarter (10 mg to 2.5 mg) for a week.  She has not experienced any withdrawal side effects.  Today- patient reports after 1 month of being off of Lexapro patient decided to go back onto Lexapro 10mg, after tapering herself off it. She decided to go back on it, has been back on it for 1 month now.   Off of lexapro, pt experienced amotivation and  suicidal thoughts and found herself only wanting to sleep.  Patient never had any active plans and she is aware of safety measures such as going to the ED, calling us, calling the suicide prevention line and is readily able to communicate her thoughts and feelings with her .  Sexual side effects did not reoccur on the Lexapro and she reports that her mood has significantly stabilized since being back on it.       Previously - she reported continued bilateral mild hand tremors about every other day for the past couple of weeks when she wakes up that is unrelated to anxiety, and she usually notices it first in the morning and seldomly in the afternoon 1-2x/wk since 9/22. She denies any other associated symptoms sx such as pain, headache, trauma, head tremor.  She forgot to mention last appointment that she has had chronic numbness and tingling. She is unaware of family hx due to being adopted. She would like to remain on her medications currently and is planning on making an appointment with her PCP (Dr. Dias, independent [facility]) and potential.  Today- Her previously reported mild bilateral hand tremors have stopped in the last several weeks, will keep her physicians updated if this reoccurs.    Bipolar disorder type I  -Denies symptoms of jose including decreased need for sleep (sleeping 8 hours), impulsivity, distractibility, grandiosity, increased risk-taking behaviors, racing thoughts  -Denies paranoia (previously was very paranoid about  cheating and boss was going to fire her)  -Last manic episode was in early 2000's  -Denies AH/VH  -Currently denies all suicidal or homicidal ideation   -Reports positive mood with good stability  -Reports good sleep  -Denies symptoms of akathisia    Anxiety  -Reports that she no longer has anxiety after decreasing Wellbutrin XL from 150 mg to 75 mg, and that it is helps with distractibility   -Denies panic attacks        PSYCHOSOCIAL CHANGES SINCE LAST VISIT  "  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 They have had a successful grand opening which she is very relieved about as they invested a lot of money into the business.  He has been breaking even And its only been open for 2 months which patient and her  are very happy about.      Patient plans to do intermittent fasting to decrease weight and continue to talk to her primary care doctor. She only gained 1 lbs over the holidays. She was previously on Atkins diet and is now wanting to stick to only 1 diet as she was fluctuating through several before.     CURRENT MEDICATIONS, ADHERENCE, AND SIDE EFFECTS   Wellbutrin XL 75 mg p.o. QAM daily-good adherence, no longer experiencing increased anxiety  Lexapro 10mg QAM daily-good adherence, less of a problem experiencing organism after adding Wellbutrin (patient restarted-see HPI)    Abilify 12.5 mg p.o. QAM daily-good adherence, weight gain  Lamictal 125 mg p.o. QHS daily- good adherence, sleepiness    Trazodone 50 mg p.o. as needed nightly (rarely uses)    Metformin 1000 mg twice daily-good adherence, no side effects    PSYCHIATRIC REVIEW OF SYSTEMS:      MOOD SYMPTOMS:   Pertinent negatives - not sad, no mood swings, not irritable, no mood changes, no grandiosity and not apathetic      ANXIETY:   Pertinent positives - depression/anxiety affecting progress    Pertinent negatives - no excessive worry, no anxiety and no panic    SLEEP:   Pertinent negatives - no difficulty falling asleep and no decreased need for sleep     PSYCHOMOTOR/ENERGY:   Pertinent negatives - no decreased energy, no increased energy, no psychomotor agitation, does not exhibit increased arousal and does not exhibit intense distress when reminded of event    EATING:   Pertinent positives: increased appetite and weight gain    Pertinent negatives: no decreased appetite    COGNITIVE:   Pertinent negatives: " no obsessions, no compulsions and no hopelessness     BEHAVIOR:   Pertinent negatives - no risky behavior    PSYCHOSIS:   Pertinent negatives - auditory hallucinations, visual hallucinations, delusions and paranoia  SOCIAL:   Pertinent negatives - no history of withdrawal symptoms    SENSORY:            MEDICAL REVIEW OF SYSTEMS  Review of Systems   Constitutional:  Positive for weight gain. Negative for chills, decreased appetite and fever.   HENT:  Negative for hearing loss.    Eyes:  Negative for blurred vision and double vision.   Respiratory:  Negative for cough.    Cardiovascular:  Negative for chest pain.   Gastrointestinal:  Negative for heartburn, nausea and vomiting.   Musculoskeletal:  Negative for myalgias.   Neurological:  Negative for dizziness, tingling, tremors, sensory change, seizures, weakness and headaches.   Psychiatric/Behavioral:  Negative for depression, hallucinations, paranoia and suicidal ideas. The patient is not nervous/anxious.         CURRENT MEDICATIONS  Current Outpatient Medications on File Prior to Visit   Medication Sig Dispense Refill    lamoTRIgine (LAMICTAL) 100 MG Tab Take 1 Tablet by mouth every day. 30 Tablet 3    lamoTRIgine (LAMICTAL) 25 MG Tab Take 1 Tablet by mouth every day. 30 Tablet 3    ARIPiprazole (ABILIFY) 10 MG Tab Take 1 Tablet by mouth every day. 30 Tablet 3    ARIPiprazole (ABILIFY) 5 MG tablet Take 0.5 Tablets by mouth every day. 30 Tablet 3    buPROPion (WELLBUTRIN) 75 MG Tab Take 1 Tablet by mouth every day. 30 Tablet 3    metformin (GLUCOPHAGE) 1000 MG tablet TAKE 1 TABLET BY MOUTH TWICE A DAY 60 Tablet 2    traZODone (DESYREL) 50 MG Tab Take 1 Tablet by mouth at bedtime as needed for Sleep (as needed for sleep). Take 1/2 to 1 tablet by mouth at bedtime. 30 Tablet 3    albuterol 108 (90 Base) MCG/ACT Aero Soln inhalation aerosol inhale 2 puff by inhalation route  every 4 - 6 hours as needed as needed      traZODone (DESYREL) 50 MG Tab Take 50 mg by mouth  every evening.      ibuprofen (MOTRIN) 600 MG TABS Take 600 mg by mouth every 6 hours as needed.      Albuterol (VENTOLIN INH) Inhale  by mouth.       No current facility-administered medications on file prior to visit.        ALLERGIES  No Known Allergies     PAST PSYCHIATRIC HISTORY  -Diagnosed with bipolar I in the early 2000's after having manic symptoms consisting of poor sleep, distractibility, unusual behaviors (excessive shopping, hypersexuality, picking fights with ) and racing thoughts for 4-7 days. Potentially would happen once a month for about a year before seeing a psychiatrist - she believes that this started occurring after she started getting clean and sober from drug abuse in which her and her  decided to get clean simultaneously together.     -Hospitalizations: 1x in [place], 20 years ago    -Suicide Attempts: multiple, most recent 3 years ago, usually OD/cuts     -Outpatient Psychiatrist: 3-4 years in this clinic   -Therapist: none     -Past Medications/Therapies:   Depakote - made legs shake   Been on Abilify since early 2000's  Primary care physician added Lamictal 2022  Dr. 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 She is not aware a family history or psychiatric illness or suicide attempt.  Pt is aware but not in touch with biological mom and dad, but to her knowledge they do not have any.    FAMILY MEDICAL HISTORY  Patient is adopted, no known family medical history    PHYSICAL EXAMINATION  Vital signs: /66 (BP Location: Left arm, Patient Position: Sitting, BP Cuff Size: Adult)   Pulse 92   Ht 1.575 m (5' 2\")   Wt 80.7 kg (178 lb)   LMP 12/01/2010   SpO2 94%   BMI 32.56 kg/m² , WT: 149 lbs (144 last visit)  Musculoskeletal: Gait is normal. No gross abnormalities noted.   Abnormal movements: None    MENTAL STATUS EXAMINATION    General: Shae Carrasco appears stated age and exhibits grooming which is appropriate, casual and neat.  Hygiene is appropriate. Pt has sleeve tattoos   Behavior: Pt is calm and cooperative with interview.  No apparent distress.  Eye contact is appropriate.   Psychomotor: Psychomotor agitation or retardation not noted.  Tics or tremors not noted.  Speech: rate within normal limits  Language: Fluent in English  Mood: \"Doing really good\"  Affect: Congruent with content and Anxious,  Thought Process: Logical and Goal-directed  Thought Content: denies suicidal ideation, denies homicidal ideation. Within normal limits  Perception: denies auditory hallucinations, denies visual hallucinations. No delusions noted on interview.  Attention span and concentration: Normal attention and concentration  Orientation: Alert and Fully Oriented  Recent and remote memory: No gross evidence of memory deficits, Recent:  Good and Remote:  Good  Insight: Good  Judgment: Good    SAFETY ASSESSMENT - RISK TO SELF  Current suicide attempts or self harm: No  Past suicide attempts or self harm: Yes  History of suicide by family member: No  History of suicide by friend/significant other: " No  Recent change in amount/specificity/intensity of suicidal thoughts or self-harm behavior: No  Ongoing substance use disorder: No  Current access to firearms, medications, or other identified means of suicide/self-harm: No  If yes, willing to restrict access to means of suicide/self-harm: N/a  Protective factors present: Yes     SAFETY ASSESSMENT - RISK TO OTHERS  Current aggressive behavior or risk to others: No  Past aggressive behavior or risk to others: No  Recent change in amount/specificity/intensity of thoughts or threats to harm others? No  Current access to firearms/other identified means of harm? No  If yes, willing to restrict access to weapons/means of harm? N/a     CURRENT RISK ASSESSMENT       Suicide: Low       Homicide: Low       Self-Harm: Low       Relapse: Not applicable       Crisis Safety Plan Reviewed Yes    [place]  records  Not indicated    ASSESSMENT  Shae Carrasco is a 47 y.o. old female presenting for follow up management of bipolar 1 disorder. All mood symptoms are well managed with her current medication regimen of Abilify and lamotrigine.     Previously self discontinued Lexapro 10mg daily has been restarted by patient as she started to experience passive suicidal ideation after being off of it for 1 month. Pt chose to restart lexapro due to mood symptoms, sexual side effects have not reoccurred, continue patient on Wellbutrin as this adjunct may be assisting with decreasing sexual side effects from reoccurring.  Patient was again instructed to monitor mood and educated that patient is able to contact provider via [employer] messaging anytime.    Patient labs were done recently and she will be sending her results to this clinic.  Her last labs were taken 1 year ago.  Last appointment CBC w diff, CMP, HA1C, lipid panel, TSH+free T4 has been ordered, however patient did not get blood work done then.  Primary care Dr. Dias is not a part of [facility] and will be getting patient's labs  done. Patient will be following with primary care for metabolic symptoms and monitoring for bilateral hand tremors, numbness and tingling.       DIAGNOSES/PLAN  Problem 1: Bipolar I Disorder   Medications:   Abilify 12.5 mg p.o. daily  lamotrigine 125 mg p.o. daily  Trazodone 50 mg p.o. as needed nightly  Psychotherapy: Not interested at this time  Labs/studies:  As above     Problem 2: Anxiety  Medications:    Wellbutrin XL 75 mg p.o. daily to augment sexual s/e of lexapro  Restarted: Lexapro 10mg po daily for anxiety and depression  Labs/studies: As above  Other: Patient provided with handout on sleep hygiene    Medication options, alternatives (including no medications) and medication risks/benefits/side effects were discussed in detail.  The patient was advised to call, message clinician on [facility], or come in to the clinic if symptoms worsen or if questions/issues regarding their medications arise.  The patient verbalized understanding and agreement.    The patient was educated to call 911, call the suicide hotline, or go to the local ER if having thoughts of suicide or homicide.  The patient verbalized understanding and agreement.   The proposed treatment plan was discussed with the patient who was provided the opportunity to ask questions and make suggestions regarding alternative treatment. Patient verbalized understanding and expressed agreement with the plan.      Return to clinic in 1 month or sooner if symptoms worsen.    This appointment was supervised by attending psychiatrist, Sana Mitchell DO, who agrees with assessment and treatment plan.  See attending attestation for more details.       Saige Payne D.O.  12/7/22

## 2023-01-04 ENCOUNTER — OFFICE VISIT (OUTPATIENT)
Dept: BEHAVIORAL HEALTH | Facility: PSYCHIATRIC FACILITY | Age: 48
End: 2023-01-04
Payer: COMMERCIAL

## 2023-01-04 VITALS
WEIGHT: 185 LBS | DIASTOLIC BLOOD PRESSURE: 80 MMHG | HEIGHT: 62 IN | BODY MASS INDEX: 34.04 KG/M2 | SYSTOLIC BLOOD PRESSURE: 110 MMHG

## 2023-01-04 DIAGNOSIS — F31.9 BIPOLAR AFFECTIVE DISORDER, REMISSION STATUS UNSPECIFIED (HCC): ICD-10-CM

## 2023-01-04 DIAGNOSIS — F41.1 GENERALIZED ANXIETY DISORDER: ICD-10-CM

## 2023-01-04 PROCEDURE — 99213 OFFICE O/P EST LOW 20 MIN: CPT | Mod: GE | Performed by: STUDENT IN AN ORGANIZED HEALTH CARE EDUCATION/TRAINING PROGRAM

## 2023-01-04 RX ORDER — TRAZODONE HYDROCHLORIDE 50 MG/1
50 TABLET ORAL NIGHTLY PRN
Qty: 30 TABLET | Refills: 3 | Status: SHIPPED | OUTPATIENT
Start: 2023-01-04 | End: 2024-02-04 | Stop reason: SDUPTHER

## 2023-01-04 RX ORDER — LAMOTRIGINE 25 MG/1
25 TABLET ORAL DAILY
Qty: 30 TABLET | Refills: 3 | Status: SHIPPED | OUTPATIENT
Start: 2023-01-04 | End: 2023-06-01 | Stop reason: SDUPTHER

## 2023-01-04 RX ORDER — ARIPIPRAZOLE 5 MG/1
2.5 TABLET ORAL DAILY
Qty: 30 TABLET | Refills: 3 | Status: SHIPPED | OUTPATIENT
Start: 2023-01-04 | End: 2023-08-22 | Stop reason: SDUPTHER

## 2023-01-04 RX ORDER — LAMOTRIGINE 100 MG/1
100 TABLET ORAL DAILY
Qty: 30 TABLET | Refills: 3 | Status: SHIPPED | OUTPATIENT
Start: 2023-01-04 | End: 2023-06-01 | Stop reason: SDUPTHER

## 2023-01-04 RX ORDER — ARIPIPRAZOLE 10 MG/1
10 TABLET ORAL DAILY
Qty: 30 TABLET | Refills: 3 | Status: SHIPPED | OUTPATIENT
Start: 2023-01-04 | End: 2023-05-16

## 2023-01-04 RX ORDER — ESCITALOPRAM OXALATE 10 MG/1
10 TABLET ORAL EVERY MORNING
Qty: 31 TABLET | Refills: 3 | Status: SHIPPED | OUTPATIENT
Start: 2023-01-04 | End: 2023-05-08

## 2023-01-04 RX ORDER — BUPROPION HYDROCHLORIDE 75 MG/1
75 TABLET ORAL DAILY
Qty: 30 TABLET | Refills: 3 | Status: SHIPPED | OUTPATIENT
Start: 2023-01-04 | End: 2023-05-16

## 2023-01-04 ASSESSMENT — ENCOUNTER SYMPTOMS
FEVER: 0
DIZZINESS: 0
SENSORY CHANGE: 0
HOPELESSNESS: 0
COUGH: 0
BLURRED VISION: 0
PANIC: 0
DOUBLE VISION: 0
HEARTBURN: 0
FREQUENT AWAKENING: 0
NAUSEA: 0
MYALGIAS: 0
WEAKNESS: 0
CHILLS: 0
DECREASED NEED FOR SLEEP: 0
HALLUCINATIONS: 0
DIFFICULTY FALLING ASLEEP: 0
DEPRESSION: 0
DECREASED ENERGY: 0
COMPULSIONS: 0
INCREASED ENERGY: 0
DECREASED APPETITE: 0
TRAUMA MANIFESTATION - EXHIBITS INCREASED AROUSAL: 0
WEIGHT GAIN: 1
TREMORS: 0
TINGLING: 0
SEIZURES: 0
PSYCHOMOTOR AGITATION: 0
VOMITING: 0
HEADACHES: 0
DELUSIONS: 0
NERVOUS/ANXIOUS: 0
PARANOIA: 0

## 2023-01-04 ASSESSMENT — SOCIAL DETERMINANTS OF HEALTH (SDOH): ANXIETY ASSOCIATED WITH SOCIAL SUPPORT SYSTEM: 0

## 2023-01-04 NOTE — PROGRESS NOTES
[facility]  Medication Management Note    Evaluation completed by: Saige Payne D.O.  Date of Service: 1/4/23   Appointment type: in-office appointment.  Information below was collected from: patient  Special language or communication needs: No  Responded to any questions about patient rights: Yes  Reviewed limits of confidentiality: Yes  Confidentiality: The patient was informed that her medical records are confidential except for use by the treatment team in this clinic and others involved in her care.  Records may be shared with outside entities if the patient signs a release of information.  Information may be shared with appropriate authorities without a release of information to report instances of child/elder abuse or if it is determined she is in imminent risk of harm to self or others.     CHIEF COMPLAINT/REASON FOR VISIT  Medication Follow Up    HISTORY OF PRESENT ILLNESS  Shae Carrasco is a 46 y.o. old female who presents today with history of bipolar disorder type 1 and anxiety.  Pt was last seen last month at which time Wellbutrin XL 75mg (which was previously decreased and continued at decreased dose), abilify 12.5mg, lamotrigine 125mg, and trazodone 50mg QHS PRN. Pt self discontinued lexapro previously, and restarted due to her slight mood worsening with passive SI which has resolved since restarting.    Previously- patient was struggling with increased anxiety, now reports that has been gone since decreasing the Wellbutrin.  Patient was previously dealing with sexual side effects of being unable to reach climax/delayed climax from the Lexapro, she decided to self wean off the medication - stated that she cut in half (10 mg to 5 mg) for 2 weeks and then cut it in a quarter (10 mg to 2.5 mg) for a week.  She has not experienced any withdrawal side effects. Patient reports after 1 month of being off of Lexapro patient decided to go back onto Lexapro 10mg, after  tapering herself off it. She decided to go back on it, as off of lexapro, pt experienced amotivation and suicidal thoughts and found herself only wanting to sleep.  Patient never had any active plans and she is aware of safety measures such as going to the ED, calling us, calling the suicide prevention line and is readily able to communicate her thoughts and feelings with her .  Sexual side effects did not reoccur on the Lexapro and she reports that her mood has significantly stabilized since being back on it.       Previously - she reported continued bilateral mild hand tremors about every other day for the past couple of weeks when she wakes up that is unrelated to anxiety, and she usually notices it first in the morning and seldomly in the afternoon 1-2x/wk since 9/22. She denies any other associated symptoms sx such as pain, headache, trauma, head tremor.  She has chronic numbness and tingling. She is unaware of family hx due to being adopted. She would like to remain on her medications currently and is planning on making an appointment with her PCP (Dr. Dias, independent [facility]) and potential.  Today- Her previously reported mild bilateral hand tremors have stopped the last several months, will keep her physicians updated if this reoccurs.    Bipolar disorder type I  -Denies symptoms of jose including decreased need for sleep (sleeping 8 hours), impulsivity, distractibility, grandiosity, increased risk-taking behaviors, racing thoughts  -Denies paranoia (previously was very paranoid about  cheating and boss was going to fire her)  -Last manic episode was in early 2000's  -Denies AH/VH  -Currently denies all suicidal or homicidal ideation   -Reports positive mood with good stability  -Reports good sleep  -Denies symptoms of akathisia    Anxiety  -Reports that she no longer has anxiety after decreasing Wellbutrin XL from 150 mg to 75 mg, and that it is helps with distractibility   -Denies panic  "attacks        PSYCHOSOCIAL CHANGES SINCE LAST VISIT   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 They have had a successful grand opening which she is very relieved about as they invested a lot of money into the business.  He has been breaking even And its only been open for 2 months which patient and her  are very happy about - now they are starting to be in the green and making surplus of money.    Patient plans to do intermittent fasting to decrease weight and continue to talk to her primary care doctor. Was not able to do over the holidays. She was previously on Atkins diet and is now wanting to stick to only 1 diet as she was fluctuating through several before.     CURRENT MEDICATIONS, ADHERENCE, AND SIDE EFFECTS   Wellbutrin XL 75 mg p.o. QAM daily-good adherence, no longer experiencing increased anxiety  Lexapro 10mg QAM daily-good adherence, less of a problem experiencing organism after adding Wellbutrin (patient restarted-see HPI)    Abilify 12.5 mg p.o. QAM daily-good adherence, weight gain  Lamictal 125 mg p.o. QHS daily- good adherence, sleepiness    Trazodone 50 mg p.o. as needed nightly (rarely uses)    Metformin 1000 mg twice daily-good adherence, no side effects    PSYCHIATRIC REVIEW OF SYSTEMS:      MOOD SYMPTOMS:   Pertinent negatives - not sad, no mood swings, not irritable, no mood changes, no grandiosity and not apathetic      ANXIETY:   Pertinent negatives - no excessive worry, no anxiety, no panic and depression/anxiety not affecting progress    SLEEP:   Pertinent negatives - no difficulty falling asleep and no decreased need for sleep     PSYCHOMOTOR/ENERGY:   Pertinent negatives - no decreased energy, no increased energy, no psychomotor agitation, does not exhibit increased arousal and does not exhibit intense distress when reminded of event    EATING:   Pertinent positives: weight gain " (due to holiday season)    Pertinent negatives: no decreased appetite and no increased appetite    COGNITIVE:   Pertinent negatives: no obsessions, no compulsions and no hopelessness     BEHAVIOR:   Pertinent negatives - no risky behavior    PSYCHOSIS:   Pertinent negatives - auditory hallucinations, visual hallucinations, delusions and paranoia  SOCIAL:   Pertinent negatives - no history of withdrawal symptoms    SENSORY:            MEDICAL REVIEW OF SYSTEMS  Review of Systems   Constitutional:  Positive for weight gain (due to holiday season). Negative for chills, decreased appetite and fever.   HENT:  Negative for hearing loss.    Eyes:  Negative for blurred vision and double vision.   Respiratory:  Negative for cough.    Cardiovascular:  Negative for chest pain.   Gastrointestinal:  Negative for heartburn, nausea and vomiting.   Musculoskeletal:  Negative for myalgias.   Neurological:  Negative for dizziness, tingling, tremors, sensory change, seizures, weakness and headaches.   Psychiatric/Behavioral:  Negative for depression, hallucinations, paranoia and suicidal ideas. The patient is not nervous/anxious.         CURRENT MEDICATIONS  Current Outpatient Medications on File Prior to Visit   Medication Sig Dispense Refill    escitalopram (LEXAPRO) 10 MG Tab Take 1 Tablet by mouth every morning for 124 days. 31 Tablet 3    lamoTRIgine (LAMICTAL) 100 MG Tab Take 1 Tablet by mouth every day. 30 Tablet 3    lamoTRIgine (LAMICTAL) 25 MG Tab Take 1 Tablet by mouth every day. 30 Tablet 3    ARIPiprazole (ABILIFY) 10 MG Tab Take 1 Tablet by mouth every day. 30 Tablet 3    ARIPiprazole (ABILIFY) 5 MG tablet Take 0.5 Tablets by mouth every day. 30 Tablet 3    buPROPion (WELLBUTRIN) 75 MG Tab Take 1 Tablet by mouth every day. 30 Tablet 3    metformin (GLUCOPHAGE) 1000 MG tablet TAKE 1 TABLET BY MOUTH TWICE A DAY 60 Tablet 2    traZODone (DESYREL) 50 MG Tab Take 1 Tablet by mouth at bedtime as needed for Sleep (as needed for  sleep). Take 1/2 to 1 tablet by mouth at bedtime. 30 Tablet 3    albuterol 108 (90 Base) MCG/ACT Aero Soln inhalation aerosol inhale 2 puff by inhalation route  every 4 - 6 hours as needed as needed      traZODone (DESYREL) 50 MG Tab Take 50 mg by mouth every evening.      ibuprofen (MOTRIN) 600 MG TABS Take 600 mg by mouth every 6 hours as needed.      Albuterol (VENTOLIN INH) Inhale  by mouth.       No current facility-administered medications on file prior to visit.        ALLERGIES  No Known Allergies     PAST PSYCHIATRIC HISTORY  -Diagnosed with bipolar I in the early 2000's after having manic symptoms consisting of poor sleep, distractibility, unusual behaviors (excessive shopping, hypersexuality, picking fights with ) and racing thoughts for 4-7 days. Potentially would happen once a month for about a year before seeing a psychiatrist - she believes that this started occurring after she started getting clean and sober from drug abuse in which her and her  decided to get clean simultaneously together.     -Hospitalizations: 1x in [place], 20 years ago    -Suicide Attempts: multiple, most recent 3 years ago, usually OD/cuts     -Outpatient Psychiatrist: 3-4 years in this clinic   -Therapist: none     -Past Medications/Therapies:   Depakote - made legs shake   Been on Abilify since early 2000's  Primary care physician added Lamictal 2022  Dr. 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    DILATION AND CURETTAGE  2004       FAMILY PSYCHIATRIC HISTORY  Patient was adopted. She is not aware a family history or psychiatric illness or suicide attempt.  Pt is aware but not in touch with biological mom and dad, but to her knowledge they do not have any.    FAMILY MEDICAL HISTORY  Patient is adopted, no known family medical history    PHYSICAL EXAMINATION  Vital signs: LMP 12/01/2010 , WT: 149 lbs (144 last visit)  Musculoskeletal: Gait is normal. No gross abnormalities noted.   Abnormal movements: None    MENTAL STATUS EXAMINATION    General: Shae Carrasco appears stated age and exhibits grooming which is appropriate, casual and neat.  Hygiene is appropriate. Pt has sleeve tattoos   Behavior: Pt is calm and cooperative with interview.  No apparent distress.  Eye contact is appropriate.   Psychomotor: Psychomotor agitation or retardation not noted.  Tics or tremors not noted.  Speech: rate within normal limits  Language: Fluent in English  Mood: \"Doing really good\"  Affect: Congruent with content and Anxious,  Thought Process: Logical and Goal-directed  Thought Content: denies suicidal ideation, denies homicidal ideation. Within normal limits  Perception: denies auditory hallucinations, denies visual hallucinations. No delusions noted on interview.  Attention span and concentration: Normal attention and concentration  Orientation: Alert and Fully Oriented  Recent and remote memory: No gross evidence of memory deficits, Recent:  Good and Remote:  Good  Insight: Good  Judgment: Good    SAFETY ASSESSMENT - RISK TO SELF  Current suicide attempts or self harm: No  Past suicide attempts or self harm: " Yes  History of suicide by family member: No  History of suicide by friend/significant other: No  Recent change in amount/specificity/intensity of suicidal thoughts or self-harm behavior: No  Ongoing substance use disorder: No  Current access to firearms, medications, or other identified means of suicide/self-harm: No  If yes, willing to restrict access to means of suicide/self-harm: N/a  Protective factors present: Yes     SAFETY ASSESSMENT - RISK TO OTHERS  Current aggressive behavior or risk to others: No  Past aggressive behavior or risk to others: No  Recent change in amount/specificity/intensity of thoughts or threats to harm others? No  Current access to firearms/other identified means of harm? No  If yes, willing to restrict access to weapons/means of harm? N/a     CURRENT RISK ASSESSMENT       Suicide: Low       Homicide: Low       Self-Harm: Low       Relapse: Not applicable       Crisis Safety Plan Reviewed Yes    [place]  records  Not indicated    ASSESSMENT  Shae Carrasco is a 47 y.o. old female presenting for follow up management of bipolar 1 disorder. All mood symptoms are well managed with her current medication regimen of Abilify, lamotrigine, and lexapro.     Previously self discontinued Lexapro 10mg daily has been restarted by patient as she started to experience passive suicidal ideation after being off of it for 1 month. Pt chose to restart lexapro due to mood symptoms, sexual side effects have not reoccurred, continue patient on Wellbutrin as this adjunct may be assisting with decreasing sexual side effects from reoccurring.  Patient was again instructed to monitor mood and educated that patient is able to contact provider via [employer] messaging anytime.    Communicated with patient that future potential options are discontinuing the lexapro and maximizing dose of wellbutrin if it may control her depression/anxiety, as well as potentially discontinuing the wellbutrin and scheduling the  trazadone. Today, pt wishes to keep same medication regimen due to sustained stability.    Patient labs were done recently and she will be sending her results to this clinic.  Her last labs were taken 1 year ago.    Last appointment CBC w diff, CMP, HA1C, lipid panel, TSH+free T4 has been ordered, however patient did not get blood work done then.  Primary care Dr. Dias is not a part of [facility] and will be getting patient's labs done. Patient will be following with primary care for metabolic symptoms and monitoring for bilateral hand tremors, numbness and tingling.       DIAGNOSES/PLAN  Problem 1: Bipolar I Disorder   Medications:   Abilify 12.5 mg p.o. daily (10mg pill+5mg pill split in half) for mood stabilization  lamotrigine 125 mg p.o. daily (100mg pill+25mg pill) for mood stabilization  Trazodone 50 mg p.o. as needed nightly for insomnia  Metformin 1,000 mg po BID for metabolic sx prophylaxis   Psychotherapy: Not interested at this time  Labs/studies:  As above     Problem 2: Anxiety  Medications:    Wellbutrin XL 75 mg p.o. daily for augment sexual s/e of lexapro  Restarted/Continued: Lexapro 10mg po daily for anxiety and depression  Labs/studies: As above  Other: Patient provided with handout on sleep hygiene    Medication options, alternatives (including no medications) and medication risks/benefits/side effects were discussed in detail.  The patient was advised to call, message clinician on [email or web address], or come in to the clinic if symptoms worsen or if questions/issues regarding their medications arise.  The patient verbalized understanding and agreement.    The patient was educated to call 911, call the suicide hotline, or go to the local ER if having thoughts of suicide or homicide.  The patient verbalized understanding and agreement.   The proposed treatment plan was discussed with the patient who was provided the opportunity to ask questions and make suggestions regarding alternative treatment. Patient  verbalized understanding and expressed agreement with the plan.      Return to clinic in 2 months or sooner if symptoms worsen.    This appointment was supervised by attending psychiatrist, Elvin Sloan MD, who agrees with assessment and treatment plan.  See attending attestation for more details.       Saige Payne D.O.  1/4/23

## 2023-03-08 ENCOUNTER — OFFICE VISIT (OUTPATIENT)
Dept: BEHAVIORAL HEALTH | Facility: PSYCHIATRIC FACILITY | Age: 48
End: 2023-03-08
Payer: COMMERCIAL

## 2023-03-08 VITALS
BODY MASS INDEX: 33.64 KG/M2 | HEIGHT: 62 IN | WEIGHT: 182.8 LBS | SYSTOLIC BLOOD PRESSURE: 117 MMHG | HEART RATE: 78 BPM | DIASTOLIC BLOOD PRESSURE: 68 MMHG

## 2023-03-08 DIAGNOSIS — F31.9 BIPOLAR AFFECTIVE DISORDER, REMISSION STATUS UNSPECIFIED (HCC): ICD-10-CM

## 2023-03-08 PROCEDURE — 99213 OFFICE O/P EST LOW 20 MIN: CPT | Mod: GE | Performed by: STUDENT IN AN ORGANIZED HEALTH CARE EDUCATION/TRAINING PROGRAM

## 2023-03-08 ASSESSMENT — ENCOUNTER SYMPTOMS
WEAKNESS: 0
CHILLS: 0
DIZZINESS: 0
PSYCHOMOTOR AGITATION: 0
TINGLING: 0
DOUBLE VISION: 0
HALLUCINATIONS: 0
VOMITING: 0
DECREASED APPETITE: 0
FREQUENT AWAKENING: 0
PANIC: 0
DELUSIONS: 0
TRAUMA MANIFESTATION - EXHIBITS INCREASED AROUSAL: 0
HEADACHES: 0
COUGH: 0
TREMORS: 0
SENSORY CHANGE: 0
DECREASED NEED FOR SLEEP: 0
DIFFICULTY FALLING ASLEEP: 0
NAUSEA: 0
DECREASED ENERGY: 0
DEPRESSION: 0
WEIGHT GAIN: 1
FEVER: 0
PARANOIA: 0
MYALGIAS: 0
BLURRED VISION: 0
HOPELESSNESS: 0
NERVOUS/ANXIOUS: 0
SEIZURES: 0
HEARTBURN: 0
COMPULSIONS: 0
INCREASED ENERGY: 0

## 2023-03-08 ASSESSMENT — SOCIAL DETERMINANTS OF HEALTH (SDOH): ANXIETY ASSOCIATED WITH SOCIAL SUPPORT SYSTEM: 0

## 2023-03-08 NOTE — PROGRESS NOTES
[facility] Medication Management Follow Up Note    Evaluation completed by: Saige Payne D.O.  Date of Service: 03/08/2023   Appointment type: in-office appointment.  Information below was collected from: patient  Special language or communication needs: No  Responded to any questions about patient rights: Yes  Reviewed limits of confidentiality: Yes  Confidentiality: The patient was informed that her medical records are confidential except for use by the treatment team in this clinic and others involved in her care.  Records may be shared with outside entities if the patient signs a release of information.  Information may be shared with appropriate authorities without a release of information to report instances of child/elder abuse or if it is determined she is in imminent risk of harm to self or others.     CHIEF COMPLAINT/REASON FOR VISIT  Medication Follow Up    HISTORY OF PRESENT ILLNESS  Shae Carrasco is a 46 y.o. old female who presents today with history of bipolar disorder type 1 and anxiety.  Pt was last seen 1/4/23 at which time we continued Wellbutrin XL 75mg (previously decreased and continued at decreased dose), abilify 12.5mg, lamotrigine 125mg, trazodone 50mg QHS PRN, and lexapro 10mg qdaily. Pt self discontinued lexapro previously, and restarted due to her slight mood worsening with passive SI which has resolved since restarting.     Since our last appointment, she has been doing well on continued medication dosages and her mood has remained stable once restarting the lexapro.    Previously- patient was struggling with increased anxiety, now reports that has been gone since decreasing the Wellbutrin.  Patient was previously dealing with sexual side effects of being unable to reach climax/delayed climax from the Lexapro, she decided to self wean off the medication - stated that she cut in half (10 mg to 5 mg) for 2 weeks and then cut it in a quarter (10 mg  "to 2.5 mg) for a week.  She has not experienced any withdrawal side effects. Patient reports after 1 month of being off of Lexapro patient decided to go back onto Lexapro 10mg, after tapering herself off it. She decided to go back on it, as off of lexapro, pt experienced amotivation and suicidal thoughts and found herself only wanting to sleep.  Patient never had any active plans and she is aware of safety measures such as going to the ED, calling us, calling the suicide prevention line and is readily able to communicate her thoughts and feelings with her .  Sexual side effects did not reoccur on the Lexapro and she reports that her mood has significantly stabilized since being back on it.       Bipolar disorder type I  -Denies symptoms of jose including decreased need for sleep (sleeping 8 hours), impulsivity, distractibility, grandiosity, increased risk-taking behaviors, racing thoughts  -Denies paranoia since increasing abilify 10mg to 12.5mg in 2021 (previously was very paranoid about  cheating and boss was going to fire her)  -Last manic episode was in early 2000's  -Denies AH/VH  -Currently denies all suicidal or homicidal ideation   -Reports positive mood with good stability  -Reports good sleep  -Denies symptoms of akathisia    Anxiety  -Reports that she no longer has anxiety after decreasing Wellbutrin XL from 150 mg to 75 mg, and that it is helps with distractibility   -Denies panic attacks        PSYCHOSOCIAL CHANGES SINCE LAST VISIT   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 They have had a successful grand opening which she is very relieved about as they invested a lot of money into the business.  He has been breaking even which patient and her  are very happy about - now they are starting to be in the green and making surplus of money.    Patient plans to do intermittent fasting to " decrease weight and continue to talk to her primary care doctor. Was not able to do over the holidays. She was previously on Atkins diet and is now wanting to stick to only one diet as she was fluctuating through several before.    Youngest son 17 y/o struggling in samuel year of high school, wanting to either be in a band or welding.      CURRENT MEDICATIONS, ADHERENCE, AND SIDE EFFECTS   Wellbutrin XL 75 mg p.o. QAM daily-good adherence, no longer experiencing increased anxiety  Lexapro 10mg QAM daily-good adherence, less of a problem experiencing organism after adding Wellbutrin (patient restarted-see HPI)    Abilify 12.5 mg p.o. QAM daily-good adherence, weight gain  Lamictal 125 mg p.o. QHS daily- good adherence, sleepiness    Trazodone 50 mg p.o. as needed nightly (rarely uses)    Metformin 1000 mg twice daily-good adherence, no side effects    PSYCHIATRIC REVIEW OF SYSTEMS:      MOOD SYMPTOMS:   Pertinent negatives - not sad, no mood swings, not irritable, no mood changes, no grandiosity and not apathetic      ANXIETY:   Pertinent negatives - no excessive worry, no anxiety, no panic and depression/anxiety not affecting progress    SLEEP:   Pertinent negatives - no difficulty falling asleep and no decreased need for sleep     PSYCHOMOTOR/ENERGY:   Pertinent negatives - no decreased energy, no increased energy, no psychomotor agitation, does not exhibit increased arousal and does not exhibit intense distress when reminded of event    EATING:   Pertinent positives: weight gain (due to holiday season)    Pertinent negatives: no decreased appetite and no increased appetite    COGNITIVE:   Pertinent negatives: no obsessions, no compulsions and no hopelessness     BEHAVIOR:   Pertinent negatives - no risky behavior    PSYCHOSIS:   Pertinent negatives - auditory hallucinations, visual hallucinations, delusions and paranoia  SOCIAL:   Pertinent negatives - no history of withdrawal symptoms     SENSORY:            MEDICAL REVIEW OF SYSTEMS  Review of Systems   Constitutional:  Positive for weight gain (due to holiday season). Negative for chills, decreased appetite and fever.   HENT:  Negative for hearing loss.    Eyes:  Negative for blurred vision and double vision.   Respiratory:  Negative for cough.    Cardiovascular:  Negative for chest pain.   Gastrointestinal:  Negative for heartburn, nausea and vomiting.   Musculoskeletal:  Negative for myalgias.   Neurological:  Negative for dizziness, tingling, tremors, sensory change, seizures, weakness and headaches.   Psychiatric/Behavioral:  Negative for depression, hallucinations, paranoia and suicidal ideas. The patient is not nervous/anxious.         CURRENT MEDICATIONS  Current Outpatient Medications on File Prior to Visit   Medication Sig Dispense Refill    ARIPiprazole (ABILIFY) 10 MG Tab Take 1 Tablet by mouth every day. 30 Tablet 3    ARIPiprazole (ABILIFY) 5 MG tablet Take 0.5 Tablets by mouth every day. 30 Tablet 3    buPROPion (WELLBUTRIN) 75 MG Tab Take 1 Tablet by mouth every day. 30 Tablet 3    escitalopram (LEXAPRO) 10 MG Tab Take 1 Tablet by mouth every morning for 124 days. 31 Tablet 3    lamoTRIgine (LAMICTAL) 100 MG Tab Take 1 Tablet by mouth every day. 30 Tablet 3    lamoTRIgine (LAMICTAL) 25 MG Tab Take 1 Tablet by mouth every day. 30 Tablet 3    metformin (GLUCOPHAGE) 1000 MG tablet Take 1 Tablet by mouth 2 times a day for 120 days. 60 Tablet 3    traZODone (DESYREL) 50 MG Tab Take 1 Tablet by mouth at bedtime as needed for Sleep (as needed for sleep). Take 1/2 to 1 tablet by mouth at bedtime. 30 Tablet 3    albuterol 108 (90 Base) MCG/ACT Aero Soln inhalation aerosol inhale 2 puff by inhalation route  every 4 - 6 hours as needed as needed      ibuprofen (MOTRIN) 600 MG TABS Take 600 mg by mouth every 6 hours as needed.      Albuterol (VENTOLIN INH) Inhale  by mouth.       No current facility-administered medications on file prior to  visit.        ALLERGIES  No Known Allergies     PAST PSYCHIATRIC HISTORY  -Diagnosed with bipolar I in the early 2000's after having manic symptoms consisting of poor sleep, distractibility, unusual behaviors (excessive shopping, hypersexuality, picking fights with ) and racing thoughts for 4-7 days. Potentially would happen once a month for about a year before seeing a psychiatrist - she believes that this started occurring after she started getting clean and sober from drug abuse in which her and her  decided to get clean simultaneously together.     -Hospitalizations: 1x in [place], 20 years ago    -Suicide Attempts: multiple, most recent 3 years ago, usually OD/cuts     -Outpatient Psychiatrist: 3-4 years in this clinic   -Therapist: none     -Past Medications/Therapies:   Depakote - made legs shake   Been on Abilify since early 2000's  Primary care physician added Lamictal 2022  Dr. 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 She is not aware a family history or psychiatric illness or suicide attempt.  Pt is  "aware but not in touch with biological mom and dad, but to her knowledge they do not have any.    FAMILY MEDICAL HISTORY  Patient is adopted, no known family medical history    PHYSICAL EXAMINATION  Vital signs: /68 (BP Location: Left arm, Patient Position: Sitting, BP Cuff Size: Adult)   Pulse 78   Ht 1.575 m (5' 2\")   Wt 82.9 kg (182 lb 12.8 oz)   LMP 12/01/2010   BMI 33.43 kg/m² , WT: 149 lbs (144 last visit)  Musculoskeletal: Gait is normal. No gross abnormalities noted.   Abnormal movements: None    MENTAL STATUS EXAMINATION    General: Shae Carrasco appears stated age and exhibits grooming which is appropriate, casual and neat.  Hygiene is appropriate. Pt has sleeve tattoos   Behavior: Pt is calm and cooperative with interview.  No apparent distress.  Eye contact is appropriate.   Psychomotor: Psychomotor agitation or retardation not noted.  Tics or tremors not noted.  Speech: rate within normal limits  Language: Fluent in English  Mood: \"Doing really good\"  Affect: Congruent with content and Anxious,  Thought Process: Logical and Goal-directed  Thought Content: denies suicidal ideation, denies homicidal ideation. Within normal limits  Perception: denies auditory hallucinations, denies visual hallucinations. No delusions noted on interview.  Attention span and concentration: Normal attention and concentration  Orientation: Alert and Fully Oriented  Recent and remote memory: No gross evidence of memory deficits, Recent:  Good and Remote:  Good  Insight: Good  Judgment: Good    SAFETY ASSESSMENT - RISK TO SELF  Current suicide attempts or self harm: No  Past suicide attempts or self harm: Yes  History of suicide by family member: No  History of suicide by friend/significant other: No  Recent change in amount/specificity/intensity of suicidal thoughts or self-harm behavior: No  Ongoing substance use disorder: No  Current access to firearms, medications, or other identified means of " suicide/self-harm: No  If yes, willing to restrict access to means of suicide/self-harm: N/a  Protective factors present: Yes     SAFETY ASSESSMENT - RISK TO OTHERS  Current aggressive behavior or risk to others: No  Past aggressive behavior or risk to others: No  Recent change in amount/specificity/intensity of thoughts or threats to harm others? No  Current access to firearms/other identified means of harm? No  If yes, willing to restrict access to weapons/means of harm? N/a     CURRENT RISK ASSESSMENT       Suicide: Low       Homicide: Low       Self-Harm: Low       Relapse: Not applicable       Crisis Safety Plan Reviewed Yes    [place]  records  Not indicated    ASSESSMENT  Shae Carrasco is a 47 y.o. old female presenting for follow up management of bipolar 1 disorder. All mood symptoms are well managed with her current medication regimen of Abilify, lamotrigine, and lexapro.     Previously self discontinued Lexapro 10mg daily has been restarted by patient as she started to experience passive suicidal ideation after being off of it for 1 month. Pt restarted and has been continuing lexapro due to mood symptoms, sexual side effects have not reoccurred, continue patient on Wellbutrin as this adjunct may be assisting with decreasing sexual side effects from reoccurring.  Patient was again instructed to monitor mood and educated that patient is able to contact provider via [employer] messaging anytime.    Communicated with patient that future potential options are discontinuing the lexapro and maximizing dose of wellbutrin if it may control her depression/anxiety, as well as potentially discontinuing the wellbutrin and scheduling the trazadone. In addition, discussed potentially doing a very gradual taper of mood stabilizers (namely lamotrigine as pt may need current abilify dosage to maintain mood stability as evidenced by pt experiencing paranoia on previous abilify decreased dosage). Today, pt wishes to keep  same medication regimen due to sustained stability.    Patient labs were done recently and she will be sending her results to this clinic.  Her last labs were taken 1 year ago.  Last appointment CBC w diff, CMP, HA1C, lipid panel, TSH+free T4 has been ordered, however patient did not get blood work done then - will be seeing PCP tomorrow 3/9/2023.  Primary care Dr. Dias is not a part of [facility] and will be getting patient's labs done. Patient will be following with primary care for metabolic symptoms and monitoring for previously reported bilateral hand tremors, numbness and tingling.       DIAGNOSES/PLAN  Problem 1: Bipolar I Disorder   Medications:   Abilify 12.5 mg p.o. daily (10mg pill+5mg pill split in half) for mood stabilization  lamotrigine 125 mg p.o. daily (100mg pill+25mg pill) for mood stabilization  Trazodone 50 mg p.o. as needed nightly for insomnia  Metformin 1,000 mg po BID for metabolic sx prophylaxis   Psychotherapy: Not interested at this time  Labs/studies:  As above     Problem 2: Anxiety  Medications:    Wellbutrin XL 75 mg p.o. daily for augment sexual s/e of lexapro  Lexapro 10mg po daily for anxiety and depression  Labs/studies: As above  Other: Patient provided with handout on sleep hygiene    Medication options, alternatives (including no medications) and medication risks/benefits/side effects were discussed in detail.  The patient was advised to call, message clinician on [employer], or come in to the clinic if symptoms worsen or if questions/issues regarding their medications arise.  The patient verbalized understanding and agreement.    The patient was educated to call 911, call the suicide hotline, or go to the local ER if having thoughts of suicide or homicide.  The patient verbalized understanding and agreement.   The proposed treatment plan was discussed with the patient who was provided the opportunity to ask questions and make suggestions regarding alternative treatment. Patient  verbalized understanding and expressed agreement with the plan.      Return to clinic in 3 months or sooner if symptoms worsen.    This appointment was supervised by attending psychiatrist, Edgar Fitch MD, who agrees with assessment and treatment plan.  See attending attestation for more details.       Saige Payne D.O.  03/08/23

## 2023-03-21 ENCOUNTER — TELEPHONE (OUTPATIENT)
Dept: BEHAVIORAL HEALTH | Facility: PSYCHIATRIC FACILITY | Age: 48
End: 2023-03-21

## 2023-03-21 NOTE — TELEPHONE ENCOUNTER
PT called, her provider wants her to go on Phentermine but wanted your permission to make sure it will not interact negatively with her current meds. PT requesting CB to [phone]. Thank you!

## 2023-04-26 ENCOUNTER — APPOINTMENT (OUTPATIENT)
Dept: BEHAVIORAL HEALTH | Facility: PSYCHIATRIC FACILITY | Age: 48
End: 2023-04-26
Payer: COMMERCIAL

## 2023-05-06 DIAGNOSIS — F41.1 GENERALIZED ANXIETY DISORDER: ICD-10-CM

## 2023-05-08 RX ORDER — ESCITALOPRAM OXALATE 10 MG/1
TABLET ORAL
Qty: 90 TABLET | Refills: 3 | Status: SHIPPED | OUTPATIENT
Start: 2023-05-08 | End: 2024-03-13

## 2023-05-08 NOTE — TELEPHONE ENCOUNTER
Received request via: Pharmacy    Was the patient seen in the last year in this department? Yes    Does the patient have an active prescription (recently filled or refills available) for medication(s) requested?  No, Out of refills    Does the patient have group home Plus and need 100 day supply (blood pressure, diabetes and cholesterol meds only)? Patient does not have SCP

## 2023-05-13 DIAGNOSIS — F31.9 BIPOLAR AFFECTIVE DISORDER, REMISSION STATUS UNSPECIFIED (HCC): ICD-10-CM

## 2023-05-15 NOTE — TELEPHONE ENCOUNTER
Received request via: Pharmacy    Was the patient seen in the last year in this department? Yes, lov = 3/8/23    Does the patient have an active prescription (recently filled or refills available) for medication(s) requested?  No, out of refills    Does the patient have detention Plus and need 100 day supply (blood pressure, diabetes and cholesterol meds only)? Patient does not have SCP

## 2023-05-16 RX ORDER — ARIPIPRAZOLE 10 MG/1
10 TABLET ORAL DAILY
Qty: 30 TABLET | Refills: 3 | Status: SHIPPED | OUTPATIENT
Start: 2023-05-16 | End: 2023-06-15 | Stop reason: SDUPTHER

## 2023-05-16 RX ORDER — BUPROPION HYDROCHLORIDE 75 MG/1
75 TABLET ORAL DAILY
Qty: 30 TABLET | Refills: 3 | Status: SHIPPED | OUTPATIENT
Start: 2023-05-16 | End: 2023-06-24 | Stop reason: SDUPTHER

## 2023-05-28 DIAGNOSIS — F31.9 BIPOLAR AFFECTIVE DISORDER, REMISSION STATUS UNSPECIFIED (HCC): ICD-10-CM

## 2023-05-30 NOTE — TELEPHONE ENCOUNTER
Received request via: Pharmacy    Was the patient seen in the last year in this department? Yes, lov = 3/8/23    Does the patient have an active prescription (recently filled or refills available) for medication(s) requested?  No, Out of refills    Does the patient have jail Plus and need 100 day supply (blood pressure, diabetes and cholesterol meds only)? Patient does not have SCP

## 2023-06-01 RX ORDER — LAMOTRIGINE 100 MG/1
100 TABLET ORAL DAILY
Qty: 30 TABLET | Refills: 3 | Status: SHIPPED | OUTPATIENT
Start: 2023-06-01 | End: 2023-10-03 | Stop reason: SDUPTHER

## 2023-06-01 RX ORDER — LAMOTRIGINE 25 MG/1
25 TABLET ORAL DAILY
Qty: 30 TABLET | Refills: 3 | Status: SHIPPED | OUTPATIENT
Start: 2023-06-01 | End: 2023-10-03 | Stop reason: SDUPTHER

## 2023-06-07 ENCOUNTER — OFFICE VISIT (OUTPATIENT)
Dept: BEHAVIORAL HEALTH | Facility: PSYCHIATRIC FACILITY | Age: 48
End: 2023-06-07
Payer: COMMERCIAL

## 2023-06-07 VITALS
WEIGHT: 162 LBS | BODY MASS INDEX: 29.81 KG/M2 | HEART RATE: 64 BPM | HEIGHT: 62 IN | DIASTOLIC BLOOD PRESSURE: 76 MMHG | SYSTOLIC BLOOD PRESSURE: 118 MMHG

## 2023-06-07 DIAGNOSIS — F31.9 BIPOLAR AFFECTIVE DISORDER, REMISSION STATUS UNSPECIFIED (HCC): ICD-10-CM

## 2023-06-07 PROCEDURE — 3078F DIAST BP <80 MM HG: CPT | Performed by: STUDENT IN AN ORGANIZED HEALTH CARE EDUCATION/TRAINING PROGRAM

## 2023-06-07 PROCEDURE — 99213 OFFICE O/P EST LOW 20 MIN: CPT | Mod: GE | Performed by: STUDENT IN AN ORGANIZED HEALTH CARE EDUCATION/TRAINING PROGRAM

## 2023-06-07 PROCEDURE — 3074F SYST BP LT 130 MM HG: CPT | Performed by: STUDENT IN AN ORGANIZED HEALTH CARE EDUCATION/TRAINING PROGRAM

## 2023-06-07 ASSESSMENT — ENCOUNTER SYMPTOMS
DOUBLE VISION: 0
DIFFICULTY FALLING ASLEEP: 0
MYALGIAS: 0
NERVOUS/ANXIOUS: 0
DELUSIONS: 0
SEIZURES: 0
RECENT WEIGHT LOSS: 1
CHILLS: 0
DIZZINESS: 0
FREQUENT AWAKENING: 0
BLURRED VISION: 0
PANIC: 0
DECREASED NEED FOR SLEEP: 0
INCREASED ENERGY: 0
DECREASED APPETITE: 1
VOMITING: 0
HALLUCINATIONS: 0
HOPELESSNESS: 0
RESTRICTION OF FOOD INTAKE: 0
DEPRESSION: 0
SENSORY CHANGE: 0
PARANOIA: 0
DECREASED ENERGY: 0
PSYCHOMOTOR AGITATION: 0
HEARTBURN: 0
TRAUMA MANIFESTATION - EXHIBITS INCREASED AROUSAL: 0
HEADACHES: 0
TREMORS: 0
COUGH: 0
WEAKNESS: 0
NAUSEA: 0
TINGLING: 0
COMPULSIONS: 0
FEVER: 0

## 2023-06-07 ASSESSMENT — SOCIAL DETERMINANTS OF HEALTH (SDOH): ANXIETY ASSOCIATED WITH SOCIAL SUPPORT SYSTEM: 0

## 2023-06-07 NOTE — PROGRESS NOTES
[facility] Medication Management Follow Up Note    Evaluation completed by: Saige Payne D.O.  Date of Service: 06/07/2023   Appointment type: in-office appointment.  Information below was collected from: patient  Special language or communication needs: No  Responded to any questions about patient rights: Yes  Reviewed limits of confidentiality: Yes  Confidentiality: The patient was informed that her medical records are confidential except for use by the treatment team in this clinic and others involved in her care.  Records may be shared with outside entities if the patient signs a release of information.  Information may be shared with appropriate authorities without a release of information to report instances of child/elder abuse or if it is determined she is in imminent risk of harm to self or others.     CHIEF COMPLAINT/REASON FOR VISIT  Medication Follow Up    HISTORY OF PRESENT ILLNESS  Shae Carrasco is a 46 y.o. old female who presents today with history of bipolar disorder type 1 and anxiety.  Pt was last seen 03/08/2023 at which time we continued Wellbutrin XL 75mg, abilify 12.5mg, lamotrigine 125mg, trazodone 50mg QHS PRN, and lexapro 10mg qdaily. Pt self discontinued lexapro previously, and restarted due to her slight mood worsening with passive SI which has resolved since restarting.     Since our last appointment, she has been doing well on continued medication dosages and her mood has remained stable once restarting the lexapro. She has been doing well on phentermine, has lost 20 lbs in 3 months and only side effect is dry mouth that doesn't bother her. She feels less groggy and has less physical I.e. back pain. She denies any agitation, sleep or mood changes since starting phentermine. Will see her PCP later this month, sees him every 3 months.     INTERVAL HISTORY   Previously- patient was struggling with increased anxiety, now reports that has been gone  "since decreasing the Wellbutrin.  Patient was previously dealing with sexual side effects of being unable to reach climax/delayed climax from the Lexapro, she decided to self wean off the medication - stated that she cut in half (10 mg to 5 mg) for 2 weeks and then cut it in a quarter (10 mg to 2.5 mg) for a week.  She has not experienced any withdrawal side effects. Patient reports after 1 month of being off of Lexapro patient decided to go back onto Lexapro 10mg, after tapering herself off it. She decided to go back on it, as off of lexapro, pt experienced amotivation and suicidal thoughts and found herself only wanting to sleep.  Patient never had any active plans and she is aware of safety measures such as going to the ED, calling us, calling the suicide prevention line and is readily able to communicate her thoughts and feelings with her .  Sexual side effects did not reoccur on the Lexapro and she reports that her mood has significantly stabilized since being back on it.     Bipolar disorder type I  -Denies symptoms of jose including decreased need for sleep (sleeping 8 hours), impulsivity, distractibility, grandiosity, increased risk-taking behaviors, racing thoughts  -Denies paranoia since increasing abilify 10mg to 12.5mg in 2021 (previously was very paranoid about  cheating and boss was going to fire her)  -Last manic episode was in early 2000's  -Denies AH/VH  -Currently denies all suicidal or homicidal ideation   -Reports positive mood with good stability  -Reports good sleep  -Denies symptoms of akathisia    Anxiety  -Reports that she no longer has anxiety after decreasing Wellbutrin XL from 150 mg to 75 mg, and that it is helps with distractibility   -Denies panic attacks        PSYCHOSOCIAL CHANGES SINCE LAST VISIT   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 They have had a successful grand " opening which she is very relieved about as they invested a lot of money into the business.  He has been breaking even which patient and her  are very happy about - now they are starting to be in the green and making surplus of money.    Patient plans to do intermittent fasting to decrease weight and continue to talk to her primary care doctor. Was not able to do over the holidays. She was previously on Atkins diet and is now wanting to stick to only one diet as she was fluctuating through several before.    Youngest son 17 y/o struggling in samuel year of high school, wanting to either be in a band or welding.     PSYCHIATRIC REVIEW OF SYSTEMS:      MOOD SYMPTOMS:   Pertinent negatives - not sad, no mood swings, not irritable, no mood changes, no grandiosity and not apathetic      ANXIETY:   Pertinent negatives - no excessive worry, no anxiety, no panic and depression/anxiety not affecting progress    SLEEP:   Pertinent negatives - no difficulty falling asleep and no decreased need for sleep     PSYCHOMOTOR/ENERGY:   Pertinent negatives - no decreased energy, no increased energy, no psychomotor agitation, does not exhibit increased arousal and does not exhibit intense distress when reminded of event    EATING:   Pertinent positives: decreased appetite (due to phentermine) and recent weight loss    Pertinent negatives: no increased appetite, not refusing to eat and no restriction of food intake  Weight gain: due to holiday season.  COGNITIVE:   Pertinent negatives: no obsessions, no compulsions and no hopelessness     BEHAVIOR:   Pertinent negatives - no risky behavior    PSYCHOSIS:   Pertinent negatives - auditory hallucinations, visual hallucinations, delusions and paranoia  SOCIAL:   Pertinent negatives - no history of withdrawal symptoms    SENSORY:              MEDICAL REVIEW OF SYSTEMS  Review of Systems   Constitutional:  Positive for decreased appetite (due to phentermine). Negative for chills and  fever. Weight gain: due to holiday season.  HENT:  Negative for hearing loss.    Eyes:  Negative for blurred vision and double vision.   Respiratory:  Negative for cough.    Cardiovascular:  Negative for chest pain.   Gastrointestinal:  Negative for heartburn, nausea and vomiting.   Musculoskeletal:  Negative for myalgias.   Neurological:  Negative for dizziness, tingling, tremors, sensory change, seizures, weakness and headaches.   Psychiatric/Behavioral:  Negative for depression, hallucinations, paranoia and suicidal ideas. The patient is not nervous/anxious.           CURRENT MEDICATIONS  Current Outpatient Medications on File Prior to Visit   Medication Sig Dispense Refill    lamoTRIgine (LAMICTAL) 25 MG Tab TAKE 1 TABLET BY MOUTH EVERY DAY 30 Tablet 3    lamoTRIgine (LAMICTAL) 100 MG Tab TAKE 1 TABLET BY MOUTH EVERY DAY 30 Tablet 3    buPROPion (WELLBUTRIN) 75 MG Tab TAKE 1 TABLET BY MOUTH EVERY DAY 30 Tablet 3    ARIPiprazole (ABILIFY) 10 MG Tab TAKE 1 TABLET BY MOUTH EVERY DAY 30 Tablet 3    escitalopram (LEXAPRO) 10 MG Tab TAKE 1 TABLET BY MOUTH EVERY MORNING 90 Tablet 3    ARIPiprazole (ABILIFY) 5 MG tablet Take 0.5 Tablets by mouth every day. 30 Tablet 3    traZODone (DESYREL) 50 MG Tab Take 1 Tablet by mouth at bedtime as needed for Sleep (as needed for sleep). Take 1/2 to 1 tablet by mouth at bedtime. 30 Tablet 3    albuterol 108 (90 Base) MCG/ACT Aero Soln inhalation aerosol inhale 2 puff by inhalation route  every 4 - 6 hours as needed as needed      ibuprofen (MOTRIN) 600 MG TABS Take 600 mg by mouth every 6 hours as needed.      Albuterol (VENTOLIN INH) Inhale  by mouth.       No current facility-administered medications on file prior to visit.        ALLERGIES  No Known Allergies     PAST PSYCHIATRIC HISTORY  -Diagnosed with bipolar I in the early 2000's after having manic symptoms consisting of poor sleep, distractibility, unusual behaviors (excessive shopping, hypersexuality, picking fights with  ) and racing thoughts for 4-7 days. Potentially would happen once a month for about a year before seeing a psychiatrist - she believes that this started occurring after she started getting clean and sober from drug abuse in which her and her  decided to get clean simultaneously together.     -Hospitalizations: 1x in [place], 20 years ago    -Suicide Attempts: multiple, most recent 3 years ago, usually OD/cuts     -Outpatient Psychiatrist: 3-4 years in this clinic   -Therapist: none     -Past Medications/Therapies:   Depakote - made legs shake   Been on Abilify since early 2000's  Primary care physician added Lamictal 2022  Dr. 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 She is not aware a family history or psychiatric illness or suicide attempt.  Pt is aware but not in touch with biological mom and dad, but to her knowledge they do not have any.    FAMILY MEDICAL HISTORY  Patient is adopted, no known family medical history    PHYSICAL EXAMINATION  Vital signs: BP  "118/76 (BP Location: Left arm, Patient Position: Sitting, BP Cuff Size: Adult)   Pulse 64   Ht 1.575 m (5' 2\")   Wt 73.5 kg (162 lb)   LMP 12/01/2010   BMI 29.63 kg/m² , WT: 149 lbs (144 last visit)  Musculoskeletal: Gait is normal. No gross abnormalities noted.   Abnormal movements: None    MENTAL STATUS EXAMINATION    General: Shae Carrasco appears stated age and exhibits grooming which is appropriate, casual and neat.  Hygiene is appropriate. Pt has sleeve tattoos   Behavior: Pt is calm and cooperative with interview.  No apparent distress.  Eye contact is appropriate.   Psychomotor: Psychomotor agitation or retardation not noted.  Tics or tremors not noted.  Speech: rate within normal limits  Language: Fluent in English  Mood: \"Doing good!\"  Affect: Flexible, Congruent with content, and Happy,  Thought Process: Logical and Goal-directed  Thought Content: denies suicidal ideation, denies homicidal ideation. Within normal limits  Perception: denies auditory hallucinations, denies visual hallucinations. No delusions noted on interview.  Attention span and concentration: Normal attention and concentration  Orientation: Alert and Fully Oriented  Recent and remote memory: No gross evidence of memory deficits, Recent:  Good and Remote:  Good  Insight: Good  Judgment: Good    SAFETY ASSESSMENT - RISK TO SELF  Current suicide attempts or self harm: No  Past suicide attempts or self harm: Yes  History of suicide by family member: No  History of suicide by friend/significant other: No  Recent change in amount/specificity/intensity of suicidal thoughts or self-harm behavior: No  Ongoing substance use disorder: No  Current access to firearms, medications, or other identified means of suicide/self-harm: No  If yes, willing to restrict access to means of suicide/self-harm: N/a  Protective factors present: Yes     SAFETY ASSESSMENT - RISK TO OTHERS  Current aggressive behavior or risk to others: No  Past aggressive " behavior or risk to others: No  Recent change in amount/specificity/intensity of thoughts or threats to harm others? No  Current access to firearms/other identified means of harm? No  If yes, willing to restrict access to weapons/means of harm? N/a     CURRENT RISK ASSESSMENT       Suicide: Low       Homicide: Low       Self-Harm: Low       Relapse: Not applicable       Crisis Safety Plan Reviewed Yes    [place]  records  Not indicated    ASSESSMENT  Shae Carrasco is a 47 y.o. old female presenting for follow up management of bipolar 1 disorder. All mood symptoms are well managed with her current medication regimen of Abilify, lamotrigine, lexapro, wellbutrin, and trazodone prn. Lexapro has previously been restarted for depressive and anxiety sx with good effect and wellbutrin has successfully treated sexual dysfunction s/e from lexapro.     She has been taking phentermine for weight loss from PCP, without any adverse mood s/e. Patient was again instructed to monitor mood and educated that patient is able to contact provider via [employer] messaging anytime.    Communicated with patient that future potential options are discontinuing the lexapro and maximizing dose of wellbutrin if it may control her depression/anxiety, as well as potentially discontinuing the wellbutrin. In addition, discussed potentially doing a very gradual taper of mood stabilizers (namely lamotrigine as pt may need current abilify dosage to maintain mood stability as evidenced by pt experiencing paranoia on previous abilify decreased dosage). Today, pt wishes to keep same medication regimen due to sustained stability and fear of losing stability.     Patient labs will be done later this month with PCP and she will be sending her results to this clinic, SIMÓN was given and pt was also instructed to obtain her own copy and to bring it to this clinic to be faxed into her chart.  Her last labs were taken 1 year ago and this writer previously  ordered CBC w diff, CMP, HA1C, lipid panel, TSH+free T4 has been ordered, however patient did not get blood work done then - will be seeing PCP June 2023.   Patient will be following with primary care for metabolic symptoms, weight loss, and monitoring for previously reported neurological symptoms.     PCP: Dr. Dias is [place]      DIAGNOSES/PLAN  #Bipolar I Disorder, in remission  Medications:   Continue Abilify 12.5 mg p.o. daily (10mg pill+5mg pill split in half) for mood stabilization  Continue lamotrigine 125 mg p.o. daily (100mg pill+25mg pill) for mood stabilization  Continue Trazodone 50 mg p.o. as needed nightly for insomnia  Continue Metformin 1,000 mg po BID for metabolic sx prophylaxis (PCP is prescribing)  Psychotherapy: Not interested at this time  Labs/studies:  As above     #Generalized Anxiety Disorder, well controlled  Medications:    Continue Wellbutrin XL 75 mg p.o. daily for augment sexual s/e of lexapro  Continue Lexapro 10mg po daily for anxiety and depression  Labs/studies: As above  Other: Reviewed sleep hygiene handout    Medication options, alternatives (including no medications) and medication risks/benefits/side effects were discussed in detail.  The patient was advised to call, message clinician on [facility], or come in to the clinic if symptoms worsen or if questions/issues regarding their medications arise.  The patient verbalized understanding and agreement.    The patient was educated to call 911, call the suicide hotline, or go to the local ER if having thoughts of suicide or homicide.  The patient verbalized understanding and agreement.   The proposed treatment plan was discussed with the patient who was provided the opportunity to ask questions and make suggestions regarding alternative treatment. Patient verbalized understanding and expressed agreement with the plan.      Return to clinic in 3 months or sooner if symptoms worsen.    This appointment was supervised by  attending psychiatrist, Sana Mitchell DO, who agrees with assessment and treatment plan.  See attending attestation for more details.       Saige Payne D.O.  06/07/2023

## 2023-06-15 DIAGNOSIS — F31.9 BIPOLAR AFFECTIVE DISORDER, REMISSION STATUS UNSPECIFIED (HCC): ICD-10-CM

## 2023-06-15 RX ORDER — ARIPIPRAZOLE 10 MG/1
10 TABLET ORAL DAILY
Qty: 30 TABLET | Refills: 3 | Status: SHIPPED | OUTPATIENT
Start: 2023-06-15 | End: 2023-07-13 | Stop reason: SDUPTHER

## 2023-06-24 DIAGNOSIS — F31.9 BIPOLAR AFFECTIVE DISORDER, REMISSION STATUS UNSPECIFIED (HCC): ICD-10-CM

## 2023-06-26 DIAGNOSIS — F31.9 BIPOLAR AFFECTIVE DISORDER, REMISSION STATUS UNSPECIFIED (HCC): ICD-10-CM

## 2023-06-26 RX ORDER — BUPROPION HYDROCHLORIDE 75 MG/1
75 TABLET ORAL DAILY
Qty: 30 TABLET | Refills: 3 | Status: SHIPPED | OUTPATIENT
Start: 2023-06-26 | End: 2024-03-13

## 2023-07-13 DIAGNOSIS — F31.9 BIPOLAR AFFECTIVE DISORDER, REMISSION STATUS UNSPECIFIED (HCC): ICD-10-CM

## 2023-07-17 RX ORDER — ARIPIPRAZOLE 10 MG/1
10 TABLET ORAL DAILY
Qty: 30 TABLET | Refills: 3 | Status: SHIPPED | OUTPATIENT
Start: 2023-07-17 | End: 2023-11-13 | Stop reason: SDUPTHER

## 2023-08-20 DIAGNOSIS — F31.9 BIPOLAR AFFECTIVE DISORDER, REMISSION STATUS UNSPECIFIED (HCC): ICD-10-CM

## 2023-08-21 NOTE — TELEPHONE ENCOUNTER
Received request via: Pharmacy    Was the patient seen in the last year in this department? Yes, lov = 6/7/23    Does the patient have an active prescription (recently filled or refills available) for medication(s) requested? No, 2 Refills remaining    Does the patient have residential Plus and need 100 day supply (blood pressure, diabetes and cholesterol meds only)? Patient does not have SCP

## 2023-08-22 RX ORDER — ARIPIPRAZOLE 5 MG/1
2.5 TABLET ORAL
Qty: 30 TABLET | Refills: 3 | Status: SHIPPED | OUTPATIENT
Start: 2023-08-22 | End: 2023-10-01 | Stop reason: SDUPTHER

## 2023-08-30 ENCOUNTER — OFFICE VISIT (OUTPATIENT)
Dept: BEHAVIORAL HEALTH | Facility: PSYCHIATRIC FACILITY | Age: 48
End: 2023-08-30
Payer: COMMERCIAL

## 2023-08-30 DIAGNOSIS — F31.9 BIPOLAR AFFECTIVE DISORDER, REMISSION STATUS UNSPECIFIED (HCC): ICD-10-CM

## 2023-08-30 DIAGNOSIS — F41.1 GAD (GENERALIZED ANXIETY DISORDER): ICD-10-CM

## 2023-08-30 PROCEDURE — 99213 OFFICE O/P EST LOW 20 MIN: CPT | Mod: GE | Performed by: STUDENT IN AN ORGANIZED HEALTH CARE EDUCATION/TRAINING PROGRAM

## 2023-08-30 ASSESSMENT — SOCIAL DETERMINANTS OF HEALTH (SDOH): ANXIETY ASSOCIATED WITH SOCIAL SUPPORT SYSTEM: 0

## 2023-08-30 ASSESSMENT — ENCOUNTER SYMPTOMS
SENSORY CHANGE: 0
HEADACHES: 0
DIZZINESS: 0
CHILLS: 0
NAUSEA: 0
HALLUCINATIONS: 0
TREMORS: 0
INCREASED ENERGY: 0
TRAUMA MANIFESTATION - EXHIBITS INCREASED AROUSAL: 0
COMPULSIONS: 0
HEARTBURN: 0
WEAKNESS: 0
DOUBLE VISION: 0
BLURRED VISION: 0
MYALGIAS: 0
RESTRICTION OF FOOD INTAKE: 0
DEPRESSION: 0
NERVOUS/ANXIOUS: 0
VOMITING: 0
DECREASED NEED FOR SLEEP: 0
PANIC: 0
PARANOIA: 0
SEIZURES: 0
FEVER: 0
FREQUENT AWAKENING: 0
DECREASED APPETITE: 0
DIFFICULTY FALLING ASLEEP: 0
PSYCHOMOTOR AGITATION: 0
TINGLING: 0
DECREASED ENERGY: 0
COUGH: 0
DELUSIONS: 0
HOPELESSNESS: 0

## 2023-08-30 NOTE — PROGRESS NOTES
[facility] Medication Management Follow Up Note    Evaluation completed by: Saige Payne D.O.  Date of Service: 08/30/2023   Appointment type: in-office appointment.  Information below was collected from: patient  Special language or communication needs: No  Responded to any questions about patient rights: Yes  Reviewed limits of confidentiality: Yes  Confidentiality: The patient was informed that her medical records are confidential except for use by the treatment team in this clinic and others involved in her care.  Records may be shared with outside entities if the patient signs a release of information.  Information may be shared with appropriate authorities without a release of information to report instances of child/elder abuse or if it is determined she is in imminent risk of harm to self or others.     CHIEF COMPLAINT/REASON FOR VISIT  Medication Follow Up    HISTORY OF PRESENT ILLNESS  Shae Carrasco is a 46 y.o. old female who presents today with history of bipolar disorder type 1 and anxiety.  Pt was last seen 06/07/2023  at which time we continued Wellbutrin XL 75mg, abilify 12.5mg, lamotrigine 125mg, trazodone 50mg QHS PRN, and lexapro 10mg qdaily. Pt self discontinued lexapro previously, and restarted due to her slight mood worsening with passive SI which has resolved since restarting.     Since our last appointment, she has been doing well on continued medication dosages and her mood has remained stable. Has since decided to stop phentermine because of issues of her  stealing her rx bottle and feels as if she doesn't need it anymore as she has previously lost 20 lbs in 3 months.    Discussed at length positive aspects of decreasing polypharmacy which patient is extremely interested in.  Extensively reviewed bipolar disorder and the different medications that she is on and the classes.  Discussed how a supervisor recommended potential trialing to  another SSRI which would allow her to be off of Lexapro which caused sexual side effects thus no longer needing Wellbutrin to treat sexual side effects, in addition also mentioned potential trialing taper of either Abilify or lamotrigine, discussed keeping mood stabilizer on board which she would like to.  Patient would like to have more time and think and discuss with her family. We went over risks, benefits, side effects, and psychopharmacology and education extensively.    Patient discontinued trazodone without negative effect.      INTERVAL HISTORY   -Patient was struggling with increased anxiety, now reports that has been gone since decreasing the Wellbutrin.  Patient was previously dealing with sexual side effects of being unable to reach climax/delayed climax from the Lexapro, she decided to self wean off the medication - stated that she cut in half (10 mg to 5 mg) for 2 weeks and then cut it in a quarter (10 mg to 2.5 mg) for a week.  She has not experienced any withdrawal side effects. Patient reports after 1 month of being off of Lexapro patient decided to go back onto Lexapro 10mg, after tapering herself off it. She decided to go back on it, as off of lexapro, pt experienced amotivation and suicidal thoughts and found herself only wanting to sleep.  Patient never had any active plans and she is aware of safety measures such as going to the ED, calling us, calling the suicide prevention line and is readily able to communicate her thoughts and feelings with her .  Sexual side effects did not reoccur on the Lexapro and she reports that her mood has significantly stabilized since being back on it.     Bipolar disorder type I  -Denies symptoms of jose including decreased need for sleep (sleeping 8 hours), impulsivity, distractibility, grandiosity, increased risk-taking behaviors, racing thoughts  -Denies paranoia since increasing abilify 10mg to 12.5mg in 2021 (previously was very paranoid about  " cheating and boss was going to fire her)  -Last manic episode was in early 2000's  -Denies AH/VH  -Currently denies all suicidal or homicidal ideation   -Reports positive mood with good stability  -Reports good sleep  -Denies symptoms of akathisia    Anxiety  -Reports that she no longer has anxiety after decreasing Wellbutrin XL from 150 mg to 75 mg, and that it is helps with distractibility   -Denies panic attacks      PSYCHOSOCIAL CHANGES SINCE LAST VISIT   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 They have had a successful grand opening which she is very relieved about as they invested a lot of money into the business.  He has been breaking even which patient and her  are very happy about - now they are starting to be in the green and making surplus of money.    Distressed about  stealing her phentermine    Youngest son 16 y/o struggling in samuel year of high school, wanting to either be in a band or welding.     PSYCHIATRIC REVIEW OF SYSTEMS:      MOOD SYMPTOMS:   Pertinent negatives - not sad, no mood swings, not irritable, no mood changes, no grandiosity and not apathetic      ANXIETY:   Pertinent negatives - no excessive worry, no anxiety, no panic and depression/anxiety not affecting progress    SLEEP:   Pertinent negatives - no difficulty falling asleep and no decreased need for sleep     PSYCHOMOTOR/ENERGY:   Pertinent negatives - no decreased energy, no increased energy, no psychomotor agitation, does not exhibit increased arousal and does not exhibit intense distress when reminded of event    EATING:   Pertinent negatives: no decreased appetite (due to phentermine prior in 2023), no increased appetite, not refusing to eat and no restriction of food intake  Weight gain: due to holiday season.  COGNITIVE:   Pertinent negatives: no obsessions, no compulsions and no hopelessness   "   BEHAVIOR:   Pertinent negatives - no risky behavior    PSYCHOSIS:   Pertinent negatives - auditory hallucinations, visual hallucinations, delusions and paranoia  SOCIAL:   Pertinent negatives - no history of withdrawal symptoms    SENSORY:        MEDICAL REVIEW OF SYSTEMS  Review of Systems   Constitutional:  Negative for chills, decreased appetite (due to phentermine prior in 2023) and fever. Weight gain: due to holiday season.  HENT:  Negative for hearing loss.    Eyes:  Negative for blurred vision and double vision.   Respiratory:  Negative for cough.    Cardiovascular:  Negative for chest pain.   Gastrointestinal:  Negative for heartburn, nausea and vomiting.   Musculoskeletal:  Negative for myalgias.   Neurological:  Negative for dizziness, tingling, tremors, sensory change, seizures, weakness and headaches.   Psychiatric/Behavioral:  Negative for depression, hallucinations, paranoia and suicidal ideas. The patient is not nervous/anxious.           CURRENT MEDICATIONS  Current Outpatient Medications on File Prior to Visit   Medication Sig Dispense Refill    ARIPiprazole (ABILIFY) 5 MG tablet TAKE 1/2 TABLET BY MOUTH EVERY DAY 30 Tablet 3    ARIPiprazole (ABILIFY) 10 MG Tab Take 1 Tablet by mouth every day. 30 Tablet 3    buPROPion (WELLBUTRIN) 75 MG Tab Take 1 Tablet by mouth every day. 30 Tablet 3    buPROPion (WELLBUTRIN) 75 MG Tab Take 1 Tablet by mouth every day. 30 Tablet 3    meloxicam (MOBIC) 15 MG tablet Take 1 Tablet by mouth every day. 30 Tablet 0    lamoTRIgine (LAMICTAL) 25 MG Tab TAKE 1 TABLET BY MOUTH EVERY DAY 30 Tablet 3    lamoTRIgine (LAMICTAL) 100 MG Tab TAKE 1 TABLET BY MOUTH EVERY DAY 30 Tablet 3    escitalopram (LEXAPRO) 10 MG Tab TAKE 1 TABLET BY MOUTH EVERY MORNING 90 Tablet 3    traZODone (DESYREL) 50 MG Tab Take 1 Tablet by mouth at bedtime as needed for Sleep (as needed for sleep). Take 1/2 to 1 tablet by mouth at bedtime. 30 Tablet 3    albuterol 108 (90 Base) MCG/ACT Aero Soln  inhalation aerosol inhale 2 puff by inhalation route  every 4 - 6 hours as needed as needed      ibuprofen (MOTRIN) 600 MG TABS Take 600 mg by mouth every 6 hours as needed.      Albuterol (VENTOLIN INH) Inhale  by mouth.       No current facility-administered medications on file prior to visit.        ALLERGIES  No Known Allergies     PAST PSYCHIATRIC HISTORY  -Diagnosed with bipolar I in the early 2000's after having manic symptoms consisting of poor sleep, distractibility, unusual behaviors (excessive shopping, hypersexuality, picking fights with ) and racing thoughts for 4-7 days. Potentially would happen once a month for about a year before seeing a psychiatrist - she believes that this started occurring after she started getting clean and sober from drug abuse in which her and her  decided to get clean simultaneously together.     -Hospitalizations: 1x in [place], 20 years ago    -Suicide Attempts: multiple, most recent 2020 usually OD/cuts     -Outpatient Psychiatrist: [facility] since 2019~  -Therapist: none     -Past Medications/Therapies:   Depakote - made legs shake   Been on Abilify since early 2000's  Primary care physician added Lamictal 2022  Dr. Stevenson added Lexapro 2022 for depression   Paxil 3 months 2020~, wasn't on any mood stabilizers threw her into depression (drs thought it was postpartum depression before)  Trazodone ~[id number] (didn't need it anymore)     -Stopped ETOH and all substance including IV drug use in early 2000's    SOCIAL HISTORY SUMMARY  -Born: [facility], living in [place] for past 20 years (was brought up here due to biological mom)  -Current Hello yes good patient's is is an important phone call yes [address] and this is for now correct and this is for consult adult consult seen by tobin brody  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 She is not aware a family history or psychiatric illness or suicide attempt.  Pt is aware but not in touch with biological mom and dad, but to her knowledge they do not have any.    FAMILY MEDICAL HISTORY  Patient is adopted, no known family medical history    PHYSICAL EXAMINATION  Vital signs: LMP 12/01/2010 , WT: 149 lbs (144 last visit)  Musculoskeletal: Gait is normal. No gross abnormalities noted.   Abnormal movements: None    MENTAL STATUS EXAMINATION    General: Shae Carrasco appears stated age and exhibits grooming which is appropriate, casual and neat.  Hygiene is appropriate. Pt has sleeve tattoos   Behavior: Pt is calm and cooperative with interview.  No apparent distress.  Eye contact is appropriate.   Psychomotor: Psychomotor agitation or retardation not noted.  Tics or tremors not noted.  Speech: rate within normal limits  Language: Fluent in English  Mood: \"Doing good!\"  Affect: Flexible, Congruent with content, and Happy,  Thought Process: Logical and Goal-directed  Thought Content: denies suicidal ideation, denies homicidal ideation. Within normal limits  Perception: denies auditory hallucinations, denies visual hallucinations. No delusions noted on interview.  Attention span and concentration: Normal attention and concentration  Orientation: Alert and Fully Oriented  Recent and remote memory: No gross evidence of memory deficits, Recent:  Good and Remote:  Good  Insight: Good  Judgment: Good    SAFETY ASSESSMENT - RISK TO SELF  Current suicide attempts or self harm: No  Past " suicide attempts or self harm: Yes  History of suicide by family member: No  History of suicide by friend/significant other: No  Recent change in amount/specificity/intensity of suicidal thoughts or self-harm behavior: No  Ongoing substance use disorder: No  Current access to firearms, medications, or other identified means of suicide/self-harm: No  If yes, willing to restrict access to means of suicide/self-harm: N/a  Protective factors present: Yes     SAFETY ASSESSMENT - RISK TO OTHERS  Current aggressive behavior or risk to others: No  Past aggressive behavior or risk to others: No  Recent change in amount/specificity/intensity of thoughts or threats to harm others? No  Current access to firearms/other identified means of harm? No  If yes, willing to restrict access to weapons/means of harm? N/a     CURRENT RISK ASSESSMENT       Suicide: Low       Homicide: Low       Self-Harm: Low       Relapse: Not applicable       Crisis Safety Plan Reviewed Yes    [place]  records  Not indicated    ASSESSMENT  Shae Carrasco is a 47 y.o. old female presenting for follow up management of bipolar 1 disorder. All mood symptoms are well managed with her current medication regimen of Abilify, lamotrigine, lexapro, wellbutrin, and trazodone prn - which was self discontinued. Lexapro has previously been restarted for depressive and anxiety sx with good effect and wellbutrin has successfully treated sexual dysfunction s/e from lexapro.     No longer taking phentermine, discussed interaction prior and again re wellbutrin and phentermine in setting of pt's mood disorder.    Discussed at length as pt has been stable for several years future visits to decrease polypharmacy. May start with SSRI, considerations for wellbutrin, and then potentially abilify and to keep lamotrigine on board. Continue to do psychoeducation with patient and she does exhibit some hesitancy as she has been stable on all of these medications.    Patient labs  from PCP will be sent to this clinic, SIMÓN was given and pt was also instructed to obtain her own copy and to bring it to this clinic to be faxed into her chart.  Her last labs were taken 2022 and this writer previously ordered CBC w diff, CMP, HA1C, lipid panel, TSH+free T4 has been ordered, however patient did not get blood work done then - will be seeing PCP 2023.   Patient will be following with primary care for metabolic symptoms, weight loss, and monitoring for previously reported neurological symptoms. PCP has been giving her metformin for metabolic sx prophylaxis as she is on abilify.    PCP: Dr. Dias is [facility]      DIAGNOSES/PLAN  #Bipolar I Disorder, in remission  Medications:   Continue Abilify 12.5 mg p.o. daily (10mg pill+5mg pill split in half) for mood stabilization  Continue lamotrigine 125 mg p.o. daily (100mg pill+25mg pill) for mood stabilization  DISCONTINUE Trazodone 50 mg p.o. as needed nightly for insomnia  Psychotherapy: Not interested at this time  Labs/studies:  As above     #Generalized Anxiety Disorder, well controlled  Medications:    Continue Wellbutrin XL 75 mg p.o. daily for augment sexual s/e of lexapro  Continue Lexapro 10mg po daily for anxiety and depression  Labs/studies: As above  Other: Previously has been given sleep hygiene handout    Medication options, alternatives (including no medications) and medication risks/benefits/side effects were discussed in detail.  The patient was advised to call, message clinician on [employer], or come in to the clinic if symptoms worsen or if questions/issues regarding their medications arise.  The patient verbalized understanding and agreement.    The patient was educated to call 911, call the suicide hotline, or go to the local ER if having thoughts of suicide or homicide.  The patient verbalized understanding and agreement.   The proposed treatment plan was discussed with the patient who was provided the opportunity to ask questions and  make suggestions regarding alternative treatment. Patient verbalized understanding and expressed agreement with the plan.      Return to clinic in 1-2 months or sooner if symptoms worsen.    This appointment was supervised by attending psychiatrist, Edgar Fitch MD, who agrees with assessment and treatment plan.  See attending attestation for more details.       Saige Payne D.O.  08/30/2023

## 2023-09-29 DIAGNOSIS — F31.9 BIPOLAR AFFECTIVE DISORDER, REMISSION STATUS UNSPECIFIED (HCC): ICD-10-CM

## 2023-10-01 DIAGNOSIS — F31.9 BIPOLAR AFFECTIVE DISORDER, REMISSION STATUS UNSPECIFIED (HCC): ICD-10-CM

## 2023-10-03 RX ORDER — LAMOTRIGINE 100 MG/1
100 TABLET ORAL DAILY
Qty: 30 TABLET | Refills: 3 | Status: SHIPPED | OUTPATIENT
Start: 2023-10-03 | End: 2024-02-04 | Stop reason: SDUPTHER

## 2023-10-03 RX ORDER — ARIPIPRAZOLE 5 MG/1
2.5 TABLET ORAL
Qty: 30 TABLET | Refills: 3 | Status: SHIPPED | OUTPATIENT
Start: 2023-10-03 | End: 2024-02-04 | Stop reason: SDUPTHER

## 2023-10-03 RX ORDER — LAMOTRIGINE 25 MG/1
25 TABLET ORAL DAILY
Qty: 30 TABLET | Refills: 3 | Status: SHIPPED | OUTPATIENT
Start: 2023-10-03 | End: 2024-02-04 | Stop reason: SDUPTHER

## 2023-11-08 ENCOUNTER — APPOINTMENT (OUTPATIENT)
Dept: BEHAVIORAL HEALTH | Facility: PSYCHIATRIC FACILITY | Age: 48
End: 2023-11-08
Payer: COMMERCIAL

## 2023-11-13 DIAGNOSIS — F31.9 BIPOLAR AFFECTIVE DISORDER, REMISSION STATUS UNSPECIFIED (HCC): ICD-10-CM

## 2023-11-14 RX ORDER — ARIPIPRAZOLE 10 MG/1
10 TABLET ORAL DAILY
Qty: 30 TABLET | Refills: 3 | Status: SHIPPED | OUTPATIENT
Start: 2023-11-14 | End: 2024-02-04 | Stop reason: SDUPTHER

## 2024-01-03 ENCOUNTER — APPOINTMENT (OUTPATIENT)
Dept: BEHAVIORAL HEALTH | Facility: PSYCHIATRIC FACILITY | Age: 49
End: 2024-01-03
Payer: COMMERCIAL

## 2024-01-10 ENCOUNTER — APPOINTMENT (OUTPATIENT)
Dept: BEHAVIORAL HEALTH | Facility: PSYCHIATRIC FACILITY | Age: 49
End: 2024-01-10
Payer: COMMERCIAL

## 2024-01-20 DIAGNOSIS — F31.9 BIPOLAR AFFECTIVE DISORDER, REMISSION STATUS UNSPECIFIED (HCC): ICD-10-CM

## 2024-02-04 DIAGNOSIS — F31.9 BIPOLAR AFFECTIVE DISORDER, REMISSION STATUS UNSPECIFIED (HCC): ICD-10-CM

## 2024-02-06 RX ORDER — TRAZODONE HYDROCHLORIDE 50 MG/1
50 TABLET ORAL NIGHTLY PRN
Qty: 30 TABLET | Refills: 3 | Status: SHIPPED | OUTPATIENT
Start: 2024-02-06

## 2024-02-06 RX ORDER — ARIPIPRAZOLE 10 MG/1
10 TABLET ORAL DAILY
Qty: 30 TABLET | Refills: 3 | Status: SHIPPED | OUTPATIENT
Start: 2024-02-06

## 2024-02-06 RX ORDER — ARIPIPRAZOLE 5 MG/1
2.5 TABLET ORAL
Qty: 30 TABLET | Refills: 3 | Status: SHIPPED | OUTPATIENT
Start: 2024-02-06 | End: 2024-03-13

## 2024-02-06 RX ORDER — LAMOTRIGINE 100 MG/1
100 TABLET ORAL DAILY
Qty: 30 TABLET | Refills: 3 | Status: SHIPPED | OUTPATIENT
Start: 2024-02-06

## 2024-02-06 RX ORDER — LAMOTRIGINE 25 MG/1
25 TABLET ORAL DAILY
Qty: 30 TABLET | Refills: 3 | Status: SHIPPED | OUTPATIENT
Start: 2024-02-06 | End: 2024-03-06

## 2024-03-02 DIAGNOSIS — F31.9 BIPOLAR AFFECTIVE DISORDER, REMISSION STATUS UNSPECIFIED (HCC): ICD-10-CM

## 2024-03-06 RX ORDER — LAMOTRIGINE 25 MG/1
25 TABLET ORAL DAILY
Qty: 30 TABLET | Refills: 3 | Status: SHIPPED | OUTPATIENT
Start: 2024-03-06

## 2024-03-13 ENCOUNTER — OFFICE VISIT (OUTPATIENT)
Dept: BEHAVIORAL HEALTH | Facility: PSYCHIATRIC FACILITY | Age: 49
End: 2024-03-13
Payer: COMMERCIAL

## 2024-03-13 VITALS
BODY MASS INDEX: 30.03 KG/M2 | DIASTOLIC BLOOD PRESSURE: 81 MMHG | OXYGEN SATURATION: 97 % | WEIGHT: 164.2 LBS | HEART RATE: 88 BPM | SYSTOLIC BLOOD PRESSURE: 138 MMHG

## 2024-03-13 DIAGNOSIS — F31.70 BIPOLAR I DISORDER IN REMISSION (HCC): ICD-10-CM

## 2024-03-13 PROCEDURE — 99213 OFFICE O/P EST LOW 20 MIN: CPT | Mod: GC | Performed by: STUDENT IN AN ORGANIZED HEALTH CARE EDUCATION/TRAINING PROGRAM

## 2024-03-13 PROCEDURE — 3079F DIAST BP 80-89 MM HG: CPT | Performed by: STUDENT IN AN ORGANIZED HEALTH CARE EDUCATION/TRAINING PROGRAM

## 2024-03-13 PROCEDURE — 3075F SYST BP GE 130 - 139MM HG: CPT | Performed by: STUDENT IN AN ORGANIZED HEALTH CARE EDUCATION/TRAINING PROGRAM

## 2024-03-13 ASSESSMENT — ENCOUNTER SYMPTOMS
NAUSEA: 0
CHILLS: 0
NERVOUS/ANXIOUS: 0
HALLUCINATIONS: 0
FEVER: 0
VOMITING: 0
HEADACHES: 0
DEPRESSION: 0
DIZZINESS: 0
COUGH: 0

## 2024-03-13 ASSESSMENT — LIFESTYLE VARIABLES: SUBSTANCE_ABUSE: 0

## 2024-03-13 NOTE — PROGRESS NOTES
Evaluation completed by: Saige Payne D.O.   Date of Service: 03/13/2024  Appointment type: in-office appointment  Attending:  Dr. Sravanthi Barnes M.D.  Information below was collected from: Patient    CHIEF COMPLIANT:  Medication Management      HPI:   Shae Carrasco is a 48 y.o. old female who presents today for regularly scheduled follow up for assessment of Medication Management       Patient was last seen on 8/30/2023 at which time plan was to:   Continue Abilify 12.5 mg p.o. daily (10mg pill+5mg pill split in half) for mood stabilization  Continue lamotrigine 125 mg p.o. daily (100mg pill+25mg pill) for mood stabilization  DISCONTINUE Trazodone 50 mg p.o. as needed nightly for insomnia  Continue Wellbutrin XL 75 mg p.o. daily for augment sexual s/e of lexapro  Continue Lexapro 10mg po daily for anxiety and depression      Today, patient reports that she been doing very well and her mood has been stable for a prolonged amount of time. No acute issues since patient was seen last year. Denies medication side effects. Reports discontinuing Lexapro and Wellbutrin over the last several months without issue, reports doing a successful taper after previous discussions with patient in the past that tapering rather than abruptly discontinuing may be best for avoiding any potential side effects.    Reviewed her history of bipolar 1, last episode of paranoia that lasted up to a month was in 2022.  Last episodes of jose were over 20 years ago, and has not been hospitalized for a depressive or manic episode since.    Discussed other potential maintenance options such as Vraylar in the future however patient is interested now in discontinuing her medications to get a more clear picture of her true baseline.    Will be decreasing Abilify today, patient is interested in later discontinuing lamotrigine after that.  Patient takes trazodone on a as needed basis up to half a week max.    Reviewed  "symptoms and psychoeducation for patient to call us, message the office, go to the emergency room, and other safety guards and the circumstance that any problems or issues come up.  Patient feels very comfortable with the plan and is happy to be back in the clinic, did not want to go to another provider, discussed insurance and [facility].    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    PSYCHIATRIC REVIEW OF SYSTEMS: current symptoms as reported by patient  Depression: Denies overt symptoms of depression such as marked depressed mood, anhedonia, sleep disturbances   Carli: Denies overt symptoms of carli such as marked elevated mood, distractability, irritability, grandiosity, flight of ideas   Anxiety/Panic Attacks: Denies overt symptoms of anxiety such as increased restlessness, panic attacks   PTSD symptom:  Patient reports no signs or symptoms indicative of PTSD   Psychosis: Patient reports no signs or symptoms indicative of psychosis    REVIEW OF SYSTEMS   Review of Systems   Constitutional:  Negative for chills and fever.   Respiratory:  Negative for cough.    Cardiovascular:  Negative for chest pain.   Gastrointestinal:  Negative for nausea and vomiting.   Neurological:  Negative for dizziness and headaches.   Psychiatric/Behavioral:  Negative for depression, hallucinations, substance abuse and suicidal ideas. The patient is not nervous/anxious.          PSYCHIATRIC EXAMINATION   Musculoskeletal: No abnormal movements noted and wnl  Appearance:  well-developed, well-nourished, appears stated age, fair hygiene, no apparent distress, and appropriately dressed, cooperative, engaged, friendly, and pleasant  Thought Process:   linear, coherent, goal-oriented, and organized  Abnormal or Psychotic Thoughts: Denies SI, denies HI, and no overt delusions noted and No overt delusions noted  SI/HI: Denies SI and HI  Speech: regular rate, rhythm, volume, tone, and syntax, coherent, and " "spontaneous  Mood: \"Life is good!\"  Affect: euthymic to bright and congruent with mood  Orientation: alert and oriented  Recent and Remote Memory: no gross impairment in immediate, recent, or remote memory  Attention Span and Concentration: Intact  Insight/Judgement into symptoms: good  Neurological Testing (MSSE Score and/or clock drawing): MMSE not performed during this encounter  /81 (BP Location: Right arm, Patient Position: Sitting, BP Cuff Size: Adult)   Pulse 88   Wt 74.5 kg (164 lb 3.2 oz) Comment: w shoes on  LMP 12/01/2010   SpO2 97%   BMI 30.03 kg/m²     CURRENT MEDICATIONS    Current Outpatient Medications:     lamoTRIgine (LAMICTAL) 25 MG Tab, TAKE 1 TABLET BY MOUTH EVERY DAY, Disp: 30 Tablet, Rfl: 3    traZODone (DESYREL) 50 MG Tab, Take 1 Tablet by mouth at bedtime as needed for Sleep (as needed for sleep). Take 1/2 to 1 tablet by mouth at bedtime., Disp: 30 Tablet, Rfl: 3    lamoTRIgine (LAMICTAL) 100 MG Tab, Take 1 Tablet by mouth every day., Disp: 30 Tablet, Rfl: 3    ARIPiprazole (ABILIFY) 10 MG Tab, Take 1 Tablet by mouth every day., Disp: 30 Tablet, Rfl: 3    metformin (GLUCOPHAGE) 1000 MG tablet, TAKE 1 TABLET BY MOUTH TWICE DAILY, Disp: 60 Tablet, Rfl: 3    meloxicam (MOBIC) 15 MG tablet, Take 1 Tablet by mouth every day., Disp: 30 Tablet, Rfl: 0    albuterol 108 (90 Base) MCG/ACT Aero Soln inhalation aerosol, inhale 2 puff by inhalation route  every 4 - 6 hours as needed as needed, Disp: , Rfl:     ibuprofen (MOTRIN) 600 MG TABS, Take 600 mg by mouth every 6 hours as needed., Disp: , Rfl:     Albuterol (VENTOLIN INH), Inhale  by mouth., Disp: , Rfl:     PAST MEDICAL HISTORY  Past Medical History:   Diagnosis Date    Other specified symptom associated with female genital organs     pelvic pain    Pain     pelvic pain    Psychiatric disorder      No Known Allergies  Past Surgical History:   Procedure Laterality Date    SUPRACERVICAL HYSTERECTOMY SCOPE  6/8/2011    Performed by " "KLAUDIA DREW at SURGERY SAME DAY [facility] ORS    DILATION AND CURETTAGE  2004        SCREENINGS:       No data to display                   LABS:  No results found for: \"CHOLSTRLTOT\", \"TRIGLYCERIDE\", \"HDL\", \"LDL\"  Lab Results   Component Value Date/Time    SODIUM 137 06/01/2016 01:06 PM    POTASSIUM 3.8 06/01/2016 01:06 PM    CHLORIDE 106 06/01/2016 01:06 PM    CO2 23 06/01/2016 01:06 PM    ANION 8.0 06/01/2016 01:06 PM    GLUCOSE 95 06/01/2016 01:06 PM    BUN 8 06/01/2016 01:06 PM    CREATININE 0.73 06/01/2016 01:06 PM    CREATININE 0.8 08/23/2007 06:05 PM    CALCIUM 8.6 06/01/2016 01:06 PM    ASTSGOT 20 06/01/2016 01:06 PM    ALTSGPT 18 06/01/2016 01:06 PM    TBILIRUBIN 0.5 06/01/2016 01:06 PM    ALBUMIN 4.2 06/01/2016 01:06 PM    TOTPROTEIN 7.2 06/01/2016 01:06 PM    GLOBULIN 3.0 06/01/2016 01:06 PM    AGRATIO 1.4 06/01/2016 01:06 PM     Lab Results   Component Value Date/Time    WBC 5.7 06/01/2016 01:06 PM    RBC 3.95 (L) 06/01/2016 01:06 PM    HEMOGLOBIN 12.4 06/01/2016 01:06 PM    HEMATOCRIT 35.5 (L) 06/01/2016 01:06 PM    MCV 89.9 06/01/2016 01:06 PM    MCH 31.4 06/01/2016 01:06 PM    MCHC 34.9 06/01/2016 01:06 PM    RDW 40.9 06/01/2016 01:06 PM    PLATELETCT 251 06/01/2016 01:06 PM    MPV 9.2 06/01/2016 01:06 PM    NEUTSPOLYS 51.80 06/01/2016 01:06 PM    LYMPHOCYTES 34.20 06/01/2016 01:06 PM    MONOCYTES 6.70 06/01/2016 01:06 PM    EOSINOPHILS 6.20 06/01/2016 01:06 PM    BASOPHILS 0.90 06/01/2016 01:06 PM    IMMGRAN 0.20 06/01/2016 01:06 PM    NRBC 0.00 06/01/2016 01:06 PM    NEUTS 2.93 06/01/2016 01:06 PM    MONOS 0.38 06/01/2016 01:06 PM    EOS 0.35 06/01/2016 01:06 PM    BASO 0.05 06/01/2016 01:06 PM    IMMGRANAB 0.01 06/01/2016 01:06 PM    NRBCAB 0.00 06/01/2016 01:06 PM     No results found for: \"HBA1C\", \"AVGLUC\"  No results found for: \"TSHULTRASEN\"  No results found for: \"FREET4\"  No results found for: \"25HYDROXY\"    PREVENTATIVE CARE    ASSESSMENT  Shae Carrasco is a 48 y.o. old female who " presents today for regularly scheduled follow up for assessment of Medication Management    For details see below    [place]   [place].  No concerns about misuse of controlled substance.    CURRENT RISK ASSESSMENT       Suicide: Low       Homicide: Low       Self-Harm: Low       Relapse: Low       Crisis Safety Plan Reviewed: Not indicated    DIAGNOSES/PLAN  Problem List Items Addressed This Visit          Psychiatry Problems    Bipolar I disorder in remission (HCC)     Last manic episode the patient was in the early 2000s.      Discontinuing Lexapro and Wellbutrin to reflect patient's successful self discontinuation in the past several months.  Can continue to discuss potential use of Vraylar in the future depending on clinical presentation.    Will continue to monitor in light of decreasing polypharmacy now that patient has progressed from precontemplative stage and anxiety of starting to work on such.  Continue to offer patient support and guidance if symptoms do occur.    Labs are taken with [facility] due to insurance, patient will be having a physical done in the next month or so, instructed to bring copy of lab results and with her.  Previously, patient was given SIMÓN to complete however prefers to get her own copies and bring it to this office.     PCP: Dr. Dias is [place]    DECREASE Abilify 12.5 mg to 10mg p.o. daily (10mg pill+5mg pill split in half) for mood stabilization  Continue lamotrigine 125 mg p.o. daily (100mg pill+25mg pill) for mood stabilization  continue Trazodone 50 mg p.o. as needed nightly for insomnia  DISCONTINUE Wellbutrin XL 75 mg p.o. daily for augment sexual s/e of lexapro  DISCONTINUE Lexapro 10mg po daily for anxiety and depression               Medication options, alternatives (including no medications) and medication risks/benefits/side effects were discussed in detail.  The patient was advised to call, message clinician on [employer], or come in to the clinic if  symptoms worsen or if questions/issues regarding their medications arise.  The patient verbalized understanding and agreement.    The patient was educated to call 911, call the suicide hotline, or go to the local ER if having thoughts of suicide or homicide.  The patient verbalized understanding and agreement.   The proposed treatment plan was discussed with the patient who was provided the opportunity to ask questions and make suggestions regarding alternative treatment. Patient verbalized understanding and expressed agreement with the plan.      Follow up in 1 month or sooner as needed.    This appointment was supervised by attending psychiatrist, Dr. Sravanthi Barnes M.D., who agrees with assessment and treatment plan.  See attending attestation for more details.

## 2024-03-13 NOTE — ASSESSMENT & PLAN NOTE
Last manic episode the patient was in the early 2000s.      Discontinuing Lexapro and Wellbutrin to reflect patient's successful self discontinuation in the past several months.  Can continue to discuss potential use of Vraylar in the future depending on clinical presentation.    Will continue to monitor in light of decreasing polypharmacy now that patient has progressed from precontemplative stage and anxiety of starting to work on such.  Continue to offer patient support and guidance if symptoms do occur.    Labs are taken with [facility] due to insurance, patient will be having a physical done in the next month or so, instructed to bring copy of lab results and with her.  Previously, patient was given SIMÓN to complete however prefers to get her own copies and bring it to this office.     PCP: Dr. Dias is [place] [facility]    DECREASE Abilify 12.5 mg to 10mg p.o. daily (10mg pill+5mg pill split in half) for mood stabilization  Continue lamotrigine 125 mg p.o. daily (100mg pill+25mg pill) for mood stabilization  continue Trazodone 50 mg p.o. as needed nightly for insomnia  DISCONTINUE Wellbutrin XL 75 mg p.o. daily for augment sexual s/e of lexapro  DISCONTINUE Lexapro 10mg po daily for anxiety and depression

## 2024-05-08 ENCOUNTER — OFFICE VISIT (OUTPATIENT)
Dept: BEHAVIORAL HEALTH | Facility: PSYCHIATRIC FACILITY | Age: 49
End: 2024-05-08
Payer: COMMERCIAL

## 2024-05-08 VITALS — BODY MASS INDEX: 29.67 KG/M2 | SYSTOLIC BLOOD PRESSURE: 128 MMHG | DIASTOLIC BLOOD PRESSURE: 72 MMHG | WEIGHT: 162.2 LBS

## 2024-05-08 DIAGNOSIS — F31.70 BIPOLAR I DISORDER IN REMISSION (HCC): ICD-10-CM

## 2024-05-08 PROCEDURE — 99213 OFFICE O/P EST LOW 20 MIN: CPT | Performed by: STUDENT IN AN ORGANIZED HEALTH CARE EDUCATION/TRAINING PROGRAM

## 2024-05-08 PROCEDURE — 3078F DIAST BP <80 MM HG: CPT | Performed by: STUDENT IN AN ORGANIZED HEALTH CARE EDUCATION/TRAINING PROGRAM

## 2024-05-08 PROCEDURE — 3074F SYST BP LT 130 MM HG: CPT | Performed by: STUDENT IN AN ORGANIZED HEALTH CARE EDUCATION/TRAINING PROGRAM

## 2024-05-08 ASSESSMENT — ENCOUNTER SYMPTOMS
NERVOUS/ANXIOUS: 0
NAUSEA: 0
CHILLS: 0
DIZZINESS: 0
VOMITING: 0
HEADACHES: 0
DEPRESSION: 0
FEVER: 0
HALLUCINATIONS: 0
COUGH: 0

## 2024-05-08 ASSESSMENT — ANXIETY QUESTIONNAIRES
2. NOT BEING ABLE TO STOP OR CONTROL WORRYING: MORE THAN HALF THE DAYS
IF YOU CHECKED OFF ANY PROBLEMS ON THIS QUESTIONNAIRE, HOW DIFFICULT HAVE THESE PROBLEMS MADE IT FOR YOU TO DO YOUR WORK, TAKE CARE OF THINGS AT HOME, OR GET ALONG WITH OTHER PEOPLE: SOMEWHAT DIFFICULT
6. BECOMING EASILY ANNOYED OR IRRITABLE: SEVERAL DAYS
5. BEING SO RESTLESS THAT IT IS HARD TO SIT STILL: NOT AT ALL
7. FEELING AFRAID AS IF SOMETHING AWFUL MIGHT HAPPEN: NOT AT ALL
GAD7 TOTAL SCORE: 9
4. TROUBLE RELAXING: MORE THAN HALF THE DAYS
1. FEELING NERVOUS, ANXIOUS, OR ON EDGE: MORE THAN HALF THE DAYS
3. WORRYING TOO MUCH ABOUT DIFFERENT THINGS: MORE THAN HALF THE DAYS

## 2024-05-08 ASSESSMENT — PATIENT HEALTH QUESTIONNAIRE - PHQ9
5. POOR APPETITE OR OVEREATING: 2 - MORE THAN HALF THE DAYS
CLINICAL INTERPRETATION OF PHQ2 SCORE: 2
SUM OF ALL RESPONSES TO PHQ QUESTIONS 1-9: 9

## 2024-05-08 ASSESSMENT — LIFESTYLE VARIABLES: SUBSTANCE_ABUSE: 0

## 2024-05-08 NOTE — ASSESSMENT & PLAN NOTE
Last manic episode the patient was in the early 2000s.  Continue to monitor mood sx.    Reflecting below as patient has self discontinued all psychotropics with exception of trazodone, largely without issue or mood dysregulation. Will be trialing Vraylar today for maintenance and potential to use in jose if indicated in future. Reviewed s/e, will be ordering labs today.    PCP: Dr. Dias is [facility]    Ordering labs today: CBC, CMP, lipid panel, thyroid panel, HA1C    DISCONTINUE Abilify 12.5 mg to 10mg p.o. daily (10mg pill+5mg pill split in half) for mood stabilization  DISCONTINUE lamotrigine 125 mg p.o. daily (100mg pill+25mg pill) for mood stabilization  continue Trazodone 50 mg p.o. as needed nightly for insomnia  DISCONTINUE Wellbutrin XL 75 mg p.o. daily for augment sexual s/e of lexapro  DISCONTINUE Lexapro 10mg po daily for anxiety and depression  START Vraylar 1.5mg qdaily for mood stabilization

## 2024-05-08 NOTE — PROGRESS NOTES
Evaluation completed by: Saige Payne D.O.   Date of Service: 05/08/2024  Appointment type: in-office appointment  Attending:  Dr. Sravanthi Barnes M.D.  Information below was collected from: Patient    CHIEF COMPLIANT:  Medication Management      HPI:   Shae Carrasco is a 48 y.o. old female who presents today for regularly scheduled follow up for assessment of Medication Management     Patient was last seen on 3/2024 at which time plan was to:   DECREASE Abilify 12.5 mg to 10mg p.o. daily (10mg pill+5mg pill split in half) for mood stabilization  Continue lamotrigine 125 mg p.o. daily (100mg pill+25mg pill) for mood stabilization  continue Trazodone 50 mg p.o. as needed nightly for insomnia  DISCONTINUE Wellbutrin XL 75 mg p.o. daily for augment sexual s/e of lexapro  DISCONTINUE Lexapro 10mg po daily for anxiety and depression    Patient states that she had done a self taper off of all of her medications (with exception of trazodone) since last visit in the past 2 months. Overall, she has not experienced significant effect from such. She has been experiencing mild intermittent anhedonia and at times hypersomnia.     Clarified that she has not exhibited any heightened mood or decreased need for sleep. Furthermore is happy to state that she has also had no symptoms at all of paranoia in which she experienced the other year when she felt that she may have been potentially going into a manic episode in the past (significant paranoia occurred for about a month in 2022). States that her  hasn't noticed any troublesome symptoms in addition.     Spoke about her previous psychotropics and goals of care namely consideration of weight, after speaking further discussed patient trialing Vraylar which she has been interested in also in the past for mood stabilization. Reviewed s/e, patient states no concerns. Was given information for [employer], is comfortable navigating insurance. Patient will be  "getting labs ordered today at [facility].      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    PSYCHIATRIC REVIEW OF SYSTEMS: current symptoms as reported by patient  Depression: Reports intermittent anhedonia and hypersomnia in the past few weeks. Denies marked depressed mood, decreased energy, decreased concentration, hopelessness, guilt   Carli: Denies overt symptoms of carli such as marked elevated mood, distractability, irritability, grandiosity, flight of ideas   Anxiety/Panic Attacks: Denies overt symptoms of anxiety such as increased restlessness, panic attacks   PTSD symptom:  Patient reports no signs or symptoms indicative of PTSD   Psychosis: Patient reports no signs or symptoms indicative of psychosis, denies AVH, paranoia    REVIEW OF SYSTEMS   Review of Systems   Constitutional:  Negative for chills and fever.   Respiratory:  Negative for cough.    Cardiovascular:  Negative for chest pain.   Gastrointestinal:  Negative for nausea and vomiting.   Neurological:  Negative for dizziness and headaches.   Psychiatric/Behavioral:  Negative for depression, hallucinations, substance abuse and suicidal ideas. The patient is not nervous/anxious.        PSYCHIATRIC EXAMINATION   Musculoskeletal: No abnormal movements noted and wnl  Appearance:  well-developed, well-nourished, appears stated age, fair hygiene, no apparent distress, and appropriately dressed, cooperative, engaged, friendly, and pleasant  Thought Process:   linear, coherent, goal-oriented, and organized  Abnormal or Psychotic Thoughts: Denies SI, denies HI, and no overt delusions noted and No overt delusions noted  SI/HI: Denies SI and HI  Speech: regular rate, rhythm, volume, tone, and syntax, coherent, and spontaneous  Mood: \"Okay!\"  Affect: euthymic to bright and congruent with mood  Orientation: alert and oriented  Recent and Remote Memory: no gross impairment in immediate, recent, or remote " memory  Attention Span and Concentration: Intact  Insight/Judgement into symptoms: good  Neurological Testing (MSSE Score and/or clock drawing): MMSE not performed during this encounter  /72 (BP Location: Left arm, Patient Position: Sitting, BP Cuff Size: Adult)   Wt 73.6 kg (162 lb 3.2 oz)   LMP 12/01/2010   BMI 29.67 kg/m²     CURRENT MEDICATIONS    Current Outpatient Medications:     cariprazine (VRAYLAR) 1.5 MG capsule, Take 1 Capsule by mouth every day for 90 days., Disp: 30 Capsule, Rfl: 2    lamoTRIgine (LAMICTAL) 25 MG Tab, TAKE 1 TABLET BY MOUTH EVERY DAY, Disp: 30 Tablet, Rfl: 3    traZODone (DESYREL) 50 MG Tab, Take 1 Tablet by mouth at bedtime as needed for Sleep (as needed for sleep). Take 1/2 to 1 tablet by mouth at bedtime., Disp: 30 Tablet, Rfl: 3    lamoTRIgine (LAMICTAL) 100 MG Tab, Take 1 Tablet by mouth every day., Disp: 30 Tablet, Rfl: 3    metformin (GLUCOPHAGE) 1000 MG tablet, TAKE 1 TABLET BY MOUTH TWICE DAILY, Disp: 60 Tablet, Rfl: 3    meloxicam (MOBIC) 15 MG tablet, Take 1 Tablet by mouth every day., Disp: 30 Tablet, Rfl: 0    albuterol 108 (90 Base) MCG/ACT Aero Soln inhalation aerosol, inhale 2 puff by inhalation route  every 4 - 6 hours as needed as needed, Disp: , Rfl:     ibuprofen (MOTRIN) 600 MG TABS, Take 600 mg by mouth every 6 hours as needed., Disp: , Rfl:     Albuterol (VENTOLIN INH), Inhale  by mouth., Disp: , Rfl:     PAST MEDICAL HISTORY  Past Medical History:   Diagnosis Date    Other specified symptom associated with female genital organs     pelvic pain    Pain     pelvic pain    Psychiatric disorder      No Known Allergies  Past Surgical History:   Procedure Laterality Date    SUPRACERVICAL HYSTERECTOMY SCOPE  6/8/2011    Performed by KLAUDIA DREW at SURGERY SAME DAY [facility]    DILATION AND CURETTAGE  2004        SCREENINGS:      5/8/2024     9:30 AM   Depression Screen (PHQ-2/PHQ-9)   PHQ-2 Total Score 2   PHQ-9 Total Score 9         5/8/2024    10:07  "AM   BH KEEGAN-7 ANXIETY SCALE FLOWSHEET   Feeling nervous, anxious, or on edge 2   Not being able to stop or control worrying 2   Worrying too much about different things 2   Trouble relaxing 2   Being so restless that it is hard to sit still 0   Becoming easily annoyed or irritable 1   Feeling afraid as if something awful might happen 0   KEEGAN-7 Total Score 9   How difficult have these problems made it for you to do your work, take care of things at home, or get along with other people? Somewhat difficult       LABS:  No results found for: \"CHOLSTRLTOT\", \"TRIGLYCERIDE\", \"HDL\", \"LDL\"  Lab Results   Component Value Date/Time    SODIUM 137 06/01/2016 01:06 PM    POTASSIUM 3.8 06/01/2016 01:06 PM    CHLORIDE 106 06/01/2016 01:06 PM    CO2 23 06/01/2016 01:06 PM    ANION 8.0 06/01/2016 01:06 PM    GLUCOSE 95 06/01/2016 01:06 PM    BUN 8 06/01/2016 01:06 PM    CREATININE 0.73 06/01/2016 01:06 PM    CREATININE 0.8 08/23/2007 06:05 PM    CALCIUM 8.6 06/01/2016 01:06 PM    ASTSGOT 20 06/01/2016 01:06 PM    ALTSGPT 18 06/01/2016 01:06 PM    TBILIRUBIN 0.5 06/01/2016 01:06 PM    ALBUMIN 4.2 06/01/2016 01:06 PM    TOTPROTEIN 7.2 06/01/2016 01:06 PM    GLOBULIN 3.0 06/01/2016 01:06 PM    AGRATIO 1.4 06/01/2016 01:06 PM     Lab Results   Component Value Date/Time    WBC 5.7 06/01/2016 01:06 PM    RBC 3.95 (L) 06/01/2016 01:06 PM    HEMOGLOBIN 12.4 06/01/2016 01:06 PM    HEMATOCRIT 35.5 (L) 06/01/2016 01:06 PM    MCV 89.9 06/01/2016 01:06 PM    MCH 31.4 06/01/2016 01:06 PM    MCHC 34.9 06/01/2016 01:06 PM    RDW 40.9 06/01/2016 01:06 PM    PLATELETCT 251 06/01/2016 01:06 PM    MPV 9.2 06/01/2016 01:06 PM    NEUTSPOLYS 51.80 06/01/2016 01:06 PM    LYMPHOCYTES 34.20 06/01/2016 01:06 PM    MONOCYTES 6.70 06/01/2016 01:06 PM    EOSINOPHILS 6.20 06/01/2016 01:06 PM    BASOPHILS 0.90 06/01/2016 01:06 PM    IMMGRAN 0.20 06/01/2016 01:06 PM    NRBC 0.00 06/01/2016 01:06 PM    NEUTS 2.93 06/01/2016 01:06 PM    MONOS 0.38 06/01/2016 01:06 PM " "   EOS 0.35 06/01/2016 01:06 PM    BASO 0.05 06/01/2016 01:06 PM    IMMGRANAB 0.01 06/01/2016 01:06 PM    NRBCAB 0.00 06/01/2016 01:06 PM     No results found for: \"HBA1C\", \"AVGLUC\"  No results found for: \"TSHULTRASEN\"  No results found for: \"FREET4\"  No results found for: \"25HYDROXY\"    PREVENTATIVE CARE    ASSESSMENT  Shae Carrasco is a 48 y.o. old female who presents today for regularly scheduled follow up for assessment of Medication Management    For details see below    [place]  records   reviewed.  No concerns about misuse of controlled substance.    CURRENT RISK ASSESSMENT       Suicide: Low       Homicide: Low       Self-Harm: Low       Relapse: Low       Crisis Safety Plan Reviewed: Not indicated    DIAGNOSES/PLAN  Problem List Items Addressed This Visit          Psychiatry Problems    Bipolar I disorder in remission (HCC)     Last manic episode the patient was in the early 2000s.  Continue to monitor mood sx.    Reflecting below as patient has self discontinued all psychotropics with exception of trazodone, largely without issue or mood dysregulation. Will be trialing Vraylar today for maintenance and potential to use in jose if indicated in future. Reviewed s/e, will be ordering labs today.    PCP: Dr. Dias is [facility] [place]    Ordering labs today: CBC, CMP, lipid panel, thyroid panel, HA1C    DISCONTINUE Abilify 12.5 mg to 10mg p.o. daily (10mg pill+5mg pill split in half) for mood stabilization  DISCONTINUE lamotrigine 125 mg p.o. daily (100mg pill+25mg pill) for mood stabilization  continue Trazodone 50 mg p.o. as needed nightly for insomnia  DISCONTINUE Wellbutrin XL 75 mg p.o. daily for augment sexual s/e of lexapro  DISCONTINUE Lexapro 10mg po daily for anxiety and depression  START Vraylar 1.5mg qdaily for mood stabilization            Relevant Medications    cariprazine (VRAYLAR) 1.5 MG capsule    Other Relevant Orders    CBC WITH DIFFERENTIAL    Comp Metabolic Panel    HEMOGLOBIN A1C    " CHOLESTEROL    THYROID PANEL WITH TSH            Medication options, alternatives (including no medications) and medication risks/benefits/side effects were discussed in detail.  The patient was advised to call, message clinician on MyChart, or come in to the clinic if symptoms worsen or if questions/issues regarding their medications arise.  The patient verbalized understanding and agreement.    The patient was educated to call 911, call the suicide hotline, or go to the local ER if having thoughts of suicide or homicide.  The patient verbalized understanding and agreement.   The proposed treatment plan was discussed with the patient who was provided the opportunity to ask questions and make suggestions regarding alternative treatment. Patient verbalized understanding and expressed agreement with the plan.      Follow up in 1 month or sooner as needed.    This appointment was supervised by attending psychiatrist, Dr. Sravanthi Barnes M.D., who agrees with assessment and treatment plan.  See attending attestation for more details.

## 2024-06-05 ENCOUNTER — TELEMEDICINE (OUTPATIENT)
Dept: BEHAVIORAL HEALTH | Facility: PSYCHIATRIC FACILITY | Age: 49
End: 2024-06-05
Payer: COMMERCIAL

## 2024-06-05 DIAGNOSIS — F31.70 BIPOLAR I DISORDER IN REMISSION (HCC): ICD-10-CM

## 2024-06-05 PROCEDURE — 99213 OFFICE O/P EST LOW 20 MIN: CPT | Performed by: STUDENT IN AN ORGANIZED HEALTH CARE EDUCATION/TRAINING PROGRAM

## 2024-06-05 ASSESSMENT — ENCOUNTER SYMPTOMS
DEPRESSION: 0
VOMITING: 0
DIZZINESS: 0
HEADACHES: 0
NAUSEA: 0
NERVOUS/ANXIOUS: 0
HALLUCINATIONS: 0
FEVER: 0
CHILLS: 0
COUGH: 0

## 2024-06-05 ASSESSMENT — LIFESTYLE VARIABLES: SUBSTANCE_ABUSE: 0

## 2024-06-05 NOTE — ASSESSMENT & PLAN NOTE
Stable, last manic episode was in 2000's. Continuing to monitor mood sx, pt has displayed stability, no acute concerns. She has trialed Vraylar with success in regards to lifting her previously depressed mood, most likely will do well to utilize for maintenance moving forward. This year has tapered/self tapered off of abilify, lamotrigine, wellbutrin, lexapro. Continues to take trazodone weekly PRN without issue. Pt would like to continue on current dosage.     PCP: Dr. Dias [facility]  Labs ordered last visit as follows/most recent labs 6/3/2024 from [facility] :  TSH: 2.7  CHOLESTEROL, TOTAL: 124  Na: 137  BUN: 13  Cr: 0.64  AST: 49  ALT: 39 (H)  HA1C: 5.2  CBC: ALL WNL    PLAN:  CONTINUE Vraylar 1.5mg qdaily for mood stabilization  continue Trazodone 50 mg p.o. as needed nightly for insomnia

## 2024-06-05 NOTE — PROGRESS NOTES
Evaluation completed by: Saige Payne D.O.   Date of Service: 06/05/2024  Appointment type: in-office appointment  Attending:  Dr. Sravanthi Barnes M.D.  Information below was collected from: Patient    CHIEF COMPLIANT:  Medication Management      HPI:   Shae Carrasco is a 48 y.o. old female who presents today for regularly scheduled follow up for assessment of Medication Management     Patient was last seen on 5/2024 at which time plan was to:   Ordering labs today: CBC, CMP, lipid panel, thyroid panel, HA1C    DISCONTINUE Abilify 12.5 mg to 10mg p.o. daily (10mg pill+5mg pill split in half) for mood stabilization  DISCONTINUE lamotrigine 125 mg p.o. daily (100mg pill+25mg pill) for mood stabilization  continue Trazodone 50 mg p.o. as needed nightly for insomnia  DISCONTINUE Wellbutrin XL 75 mg p.o. daily for augment sexual s/e of lexapro  DISCONTINUE Lexapro 10mg po daily for anxiety and depression  START Vraylar 1.5mg qdaily for mood stabilization      Patient states she is doing very well since starting Vraylar she has been feeling much better than last visit/in the last several months since tapering/self tapering her previous medication and for a few weeks being on no medication. She states that she noticed her depression sx lift about a week after initiation of Vraylar. She takes it @4AM before her morning shift with her coffee without issue.     Pt's mood continues to be stable, she continues to be adherent to CPAP to treat her CONSTANCE (has been for the past 5 years), and sleeps about ~7h/night. States that she continues to use trazodone without issue about weekly PRN, denies oversedation. Reports that she has been happier, denies anhedonia denies hypersomnia which is a ortiz contrast from the last several months. Denies side effects to medication.     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 Reviewed labs  "together with patient, labs WNL with exception of slightly elevated ALT that she discussed with PCP, pt denies drinking alcohol and substance use.     Let her know of schedule shift to Friday AM clinics next academic year which she has no issues with.  Is very grateful to this provider, supervisor and clinic.    PSYCHIATRIC REVIEW OF SYSTEMS: current symptoms as reported by patient  Depression: DENIES anhedonia and hypersomnia. Denies marked depressed mood, decreased energy, decreased concentration, hopelessness, guilt   Carli: Denies overt symptoms of carli such as marked elevated mood, distractability, irritability, grandiosity, flight of ideas   Anxiety/Panic Attacks: Denies overt symptoms of anxiety such as increased restlessness, panic attacks   PTSD symptom:  Patient reports no signs or symptoms indicative of PTSD   Psychosis: Patient reports no signs or symptoms indicative of psychosis, denies AVH, paranoia    REVIEW OF SYSTEMS   Review of Systems   Constitutional:  Negative for chills and fever.   Respiratory:  Negative for cough.    Cardiovascular:  Negative for chest pain.   Gastrointestinal:  Negative for nausea and vomiting.   Neurological:  Negative for dizziness and headaches.   Psychiatric/Behavioral:  Negative for depression, hallucinations, substance abuse and suicidal ideas. The patient is not nervous/anxious.        PSYCHIATRIC EXAMINATION   Musculoskeletal: No abnormal movements noted and wnl  Appearance:  well-developed, well-nourished, appears stated age, fair hygiene, no apparent distress, and appropriately dressed, cooperative, engaged, friendly, and pleasant, wearing glasses  Thought Process:   linear, coherent, goal-oriented, and organized  Abnormal or Psychotic Thoughts: Denies SI, denies HI, and no overt delusions noted and No overt delusions noted  SI/HI: Denies SI and HI  Speech: regular rate, rhythm, volume, tone, and syntax, coherent, and spontaneous  Mood: \"Much  better!\"  Affect: " Bright and congruent with mood  Orientation: alert and oriented  Recent and Remote Memory: no gross impairment in immediate, recent, or remote memory  Attention Span and Concentration: Intact  Insight/Judgement into symptoms: good  Neurological Testing (MSSE Score and/or clock drawing): MMSE not performed during this encounter  LMP 12/01/2010     CURRENT MEDICATIONS    Current Outpatient Medications:     cariprazine (VRAYLAR) 1.5 MG capsule, Take 1 Capsule by mouth every day for 90 days., Disp: 30 Capsule, Rfl: 2    lamoTRIgine (LAMICTAL) 25 MG Tab, TAKE 1 TABLET BY MOUTH EVERY DAY, Disp: 30 Tablet, Rfl: 3    traZODone (DESYREL) 50 MG Tab, Take 1 Tablet by mouth at bedtime as needed for Sleep (as needed for sleep). Take 1/2 to 1 tablet by mouth at bedtime., Disp: 30 Tablet, Rfl: 3    lamoTRIgine (LAMICTAL) 100 MG Tab, Take 1 Tablet by mouth every day., Disp: 30 Tablet, Rfl: 3    metformin (GLUCOPHAGE) 1000 MG tablet, TAKE 1 TABLET BY MOUTH TWICE DAILY, Disp: 60 Tablet, Rfl: 3    meloxicam (MOBIC) 15 MG tablet, Take 1 Tablet by mouth every day., Disp: 30 Tablet, Rfl: 0    albuterol 108 (90 Base) MCG/ACT Aero Soln inhalation aerosol, inhale 2 puff by inhalation route  every 4 - 6 hours as needed as needed, Disp: , Rfl:     ibuprofen (MOTRIN) 600 MG TABS, Take 600 mg by mouth every 6 hours as needed., Disp: , Rfl:     Albuterol (VENTOLIN INH), Inhale  by mouth., Disp: , Rfl:     PAST MEDICAL HISTORY  Past Medical History:   Diagnosis Date    Other specified symptom associated with female genital organs     pelvic pain    Pain     pelvic pain    Psychiatric disorder      No Known Allergies  Past Surgical History:   Procedure Laterality Date    SUPRACERVICAL HYSTERECTOMY SCOPE  6/8/2011    Performed by KLAUDIA DREW at SURGERY SAME DAY [facility]    DILATION AND CURETTAGE  2004        SCREENINGS:      5/8/2024     9:30 AM   Depression Screen (PHQ-2/PHQ-9)   PHQ-2 Total Score 2   PHQ-9 Total Score 9         5/8/2024  "   10:07 AM    KEEGAN-7 ANXIETY SCALE FLOWSHEET   Feeling nervous, anxious, or on edge 2   Not being able to stop or control worrying 2   Worrying too much about different things 2   Trouble relaxing 2   Being so restless that it is hard to sit still 0   Becoming easily annoyed or irritable 1   Feeling afraid as if something awful might happen 0   KEEGAN-7 Total Score 9   How difficult have these problems made it for you to do your work, take care of things at home, or get along with other people? Somewhat difficult       LABS:  No results found for: \"CHOLSTRLTOT\", \"TRIGLYCERIDE\", \"HDL\", \"LDL\"  Lab Results   Component Value Date/Time    SODIUM 137 06/01/2016 01:06 PM    POTASSIUM 3.8 06/01/2016 01:06 PM    CHLORIDE 106 06/01/2016 01:06 PM    CO2 23 06/01/2016 01:06 PM    ANION 8.0 06/01/2016 01:06 PM    GLUCOSE 95 06/01/2016 01:06 PM    BUN 8 06/01/2016 01:06 PM    CREATININE 0.73 06/01/2016 01:06 PM    CREATININE 0.8 08/23/2007 06:05 PM    CALCIUM 8.6 06/01/2016 01:06 PM    ASTSGOT 20 06/01/2016 01:06 PM    ALTSGPT 18 06/01/2016 01:06 PM    TBILIRUBIN 0.5 06/01/2016 01:06 PM    ALBUMIN 4.2 06/01/2016 01:06 PM    TOTPROTEIN 7.2 06/01/2016 01:06 PM    GLOBULIN 3.0 06/01/2016 01:06 PM    AGRATIO 1.4 06/01/2016 01:06 PM     Lab Results   Component Value Date/Time    WBC 5.7 06/01/2016 01:06 PM    RBC 3.95 (L) 06/01/2016 01:06 PM    HEMOGLOBIN 12.4 06/01/2016 01:06 PM    HEMATOCRIT 35.5 (L) 06/01/2016 01:06 PM    MCV 89.9 06/01/2016 01:06 PM    MCH 31.4 06/01/2016 01:06 PM    MCHC 34.9 06/01/2016 01:06 PM    RDW 40.9 06/01/2016 01:06 PM    PLATELETCT 251 06/01/2016 01:06 PM    MPV 9.2 06/01/2016 01:06 PM    NEUTSPOLYS 51.80 06/01/2016 01:06 PM    LYMPHOCYTES 34.20 06/01/2016 01:06 PM    MONOCYTES 6.70 06/01/2016 01:06 PM    EOSINOPHILS 6.20 06/01/2016 01:06 PM    BASOPHILS 0.90 06/01/2016 01:06 PM    IMMGRAN 0.20 06/01/2016 01:06 PM    NRBC 0.00 06/01/2016 01:06 PM    NEUTS 2.93 06/01/2016 01:06 PM    MONOS 0.38 06/01/2016 " "01:06 PM    EOS 0.35 06/01/2016 01:06 PM    BASO 0.05 06/01/2016 01:06 PM    IMMGRANAB 0.01 06/01/2016 01:06 PM    NRBCAB 0.00 06/01/2016 01:06 PM     No results found for: \"HBA1C\", \"AVGLUC\"  No results found for: \"TSHULTRASEN\"  No results found for: \"FREET4\"  No results found for: \"25HYDROXY\"    PREVENTATIVE CARE    ASSESSMENT  Shae Carrasco is a 48 y.o. old female who presents today for regularly scheduled follow up for assessment of Medication Management    Details below    [place]  records   reviewed.  No concerns about misuse of controlled substance.    CURRENT RISK ASSESSMENT       Suicide: Low       Homicide: Low       Self-Harm: Low       Relapse: Low       Crisis Safety Plan Reviewed: Not indicated    DIAGNOSES/PLAN  Problem List Items Addressed This Visit          Psychiatry Problems    Bipolar I disorder in remission (HCC)     Stable, last manic episode was in 2000's. Continuing to monitor mood sx, pt has displayed stability, no acute concerns. She has trialed Vraylar with success in regards to lifting her previously depressed mood, most likely will do well to utilize for maintenance moving forward. This year has tapered/self tapered off of abilify, lamotrigine, wellbutrin, lexapro. Continues to take trazodone weekly PRN without issue. Pt would like to continue on current dosage.     PCP: Dr. Dias is [facility]  Labs ordered last visit as follows/most recent labs 6/3/2024 from [facility]:  TSH: 2.7  CHOLESTEROL, TOTAL: 124  Na: 137  BUN: 13  Cr: 0.64  AST: 49  ALT: 39 (H)  HA1C: 5.2  CBC: ALL WNL    PLAN:  CONTINUE Vraylar 1.5mg qdaily for mood stabilization  continue Trazodone 50 mg p.o. as needed nightly for insomnia                     Medication options, alternatives (including no medications) and medication risks/benefits/side effects were discussed in detail.  The patient was advised to call, message clinician on [facility], or come in to the clinic if symptoms worsen or if " questions/issues regarding their medications arise.  The patient verbalized understanding and agreement.    The patient was educated to call 911, call the suicide hotline, or go to the local ER if having thoughts of suicide or homicide.  The patient verbalized understanding and agreement.   The proposed treatment plan was discussed with the patient who was provided the opportunity to ask questions and make suggestions regarding alternative treatment. Patient verbalized understanding and expressed agreement with the plan.      Follow up in 1 month or sooner as needed.    This appointment was supervised by attending psychiatrist, Dr. Sravanthi Barnes M.D., who agrees with assessment and treatment plan.  See attending attestation for more details.

## 2024-07-12 ENCOUNTER — TELEMEDICINE (OUTPATIENT)
Dept: BEHAVIORAL HEALTH | Facility: PSYCHIATRIC FACILITY | Age: 49
End: 2024-07-12
Payer: COMMERCIAL

## 2024-07-12 DIAGNOSIS — F31.70 BIPOLAR I DISORDER IN REMISSION (HCC): ICD-10-CM

## 2024-07-12 PROCEDURE — 99213 OFFICE O/P EST LOW 20 MIN: CPT | Performed by: STUDENT IN AN ORGANIZED HEALTH CARE EDUCATION/TRAINING PROGRAM

## 2024-07-12 ASSESSMENT — ENCOUNTER SYMPTOMS
ABDOMINAL PAIN: 0
DEPRESSION: 0
DIZZINESS: 0
HALLUCINATIONS: 0
NERVOUS/ANXIOUS: 0
FEVER: 0
HEADACHES: 0
COUGH: 0
INSOMNIA: 0

## 2024-07-12 ASSESSMENT — LIFESTYLE VARIABLES: SUBSTANCE_ABUSE: 0

## 2024-08-02 ENCOUNTER — OFFICE VISIT (OUTPATIENT)
Dept: BEHAVIORAL HEALTH | Facility: PSYCHIATRIC FACILITY | Age: 49
End: 2024-08-02
Payer: COMMERCIAL

## 2024-08-02 DIAGNOSIS — F41.1 GAD (GENERALIZED ANXIETY DISORDER): ICD-10-CM

## 2024-08-02 DIAGNOSIS — F43.10 PTSD (POST-TRAUMATIC STRESS DISORDER): ICD-10-CM

## 2024-08-02 DIAGNOSIS — F31.70 BIPOLAR I DISORDER IN REMISSION (HCC): ICD-10-CM

## 2024-08-02 PROCEDURE — 99214 OFFICE O/P EST MOD 30 MIN: CPT

## 2024-08-02 ASSESSMENT — ANXIETY QUESTIONNAIRES
2. NOT BEING ABLE TO STOP OR CONTROL WORRYING: SEVERAL DAYS
3. WORRYING TOO MUCH ABOUT DIFFERENT THINGS: SEVERAL DAYS
GAD7 TOTAL SCORE: 8
5. BEING SO RESTLESS THAT IT IS HARD TO SIT STILL: SEVERAL DAYS
IF YOU CHECKED OFF ANY PROBLEMS ON THIS QUESTIONNAIRE, HOW DIFFICULT HAVE THESE PROBLEMS MADE IT FOR YOU TO DO YOUR WORK, TAKE CARE OF THINGS AT HOME, OR GET ALONG WITH OTHER PEOPLE: SOMEWHAT DIFFICULT
6. BECOMING EASILY ANNOYED OR IRRITABLE: SEVERAL DAYS
4. TROUBLE RELAXING: SEVERAL DAYS
7. FEELING AFRAID AS IF SOMETHING AWFUL MIGHT HAPPEN: SEVERAL DAYS
1. FEELING NERVOUS, ANXIOUS, OR ON EDGE: MORE THAN HALF THE DAYS

## 2024-08-02 ASSESSMENT — PATIENT HEALTH QUESTIONNAIRE - PHQ9: CLINICAL INTERPRETATION OF PHQ2 SCORE: 0
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
## 2024-08-09 ENCOUNTER — OFFICE VISIT (OUTPATIENT)
Dept: BEHAVIORAL HEALTH | Facility: PSYCHIATRIC FACILITY | Age: 49
End: 2024-08-09
Payer: COMMERCIAL

## 2024-08-09 DIAGNOSIS — F43.10 PTSD (POST-TRAUMATIC STRESS DISORDER): ICD-10-CM

## 2024-08-09 DIAGNOSIS — F41.1 GAD (GENERALIZED ANXIETY DISORDER): ICD-10-CM

## 2024-08-09 DIAGNOSIS — F31.70 BIPOLAR I DISORDER IN REMISSION (HCC): ICD-10-CM

## 2024-08-09 PROCEDURE — 90834 PSYTX W PT 45 MINUTES: CPT | Mod: GC
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
## 2024-08-16 ENCOUNTER — OFFICE VISIT (OUTPATIENT)
Dept: BEHAVIORAL HEALTH | Facility: PSYCHIATRIC FACILITY | Age: 49
End: 2024-08-16
Payer: COMMERCIAL

## 2024-08-16 DIAGNOSIS — F41.1 GAD (GENERALIZED ANXIETY DISORDER): ICD-10-CM

## 2024-08-16 DIAGNOSIS — F43.10 PTSD (POST-TRAUMATIC STRESS DISORDER): ICD-10-CM

## 2024-08-16 PROCEDURE — 90834 PSYTX W PT 45 MINUTES: CPT
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
## 2024-08-16 NOTE — PROGRESS NOTES
[facility]  Psychotherapy Summary Note    Full therapy note has been documented and is under restricted viewing.  Please see below for summary of today's session.     Date of Service: 08/16/2024  Appointment type: in-office appointment.  Attending:  Laurence Mercado M.D.     Type of session:Individual psychotherapy  Session start time: 3:00pm  Session stop time: 3:45pm  Length of time spent face to face with patient: 45min  Persons in attendance: Patient    SI reported: no  HI reported: no    SUMMARY  Shae Carrasco is a 48 y.o. old female who presents today for regularly scheduled psychotherapy for Psychosocial Conselling      Symptoms currently being addressed in therapy: anxiety, interpersonal difficulty, anger, and trauma    Therapeutic Intervention(s): Cognitive behavioral therapy, Conflict resolution skills, Distress tolerance skills, Goal-setting, Maladaptive behavior addressed, Positive behavior reinforced, Role-playing, and Self-care skills    CURRENT RISK ASSESSMENT       Suicide: Low       Homicide: Low       Self-Harm: Low       Relapse: Low       Crisis Safety Plan Reviewed Not Indicated    DIAGNOSES/PLAN  Problem List Items Addressed This Visit          Psychiatry Problems    KEEGAN (generalized anxiety disorder)     Markedly improved anxiety and related obsessive thought.  - Continue weekly therapy            PTSD (post-traumatic stress disorder)     - Continue weekly therapy                 Treatment Goal(s)/Objective(s) addressed: Tx plan:  Utilize learned skills to manage mood and emotional suffering more effectively  Learn to successfully challenge & change distortions in thinking  Learn to ameliorate effects of anxiety on life and functioning  Increase behaviors of self-compassion and self-care   Continue practicing CBT worksheet.     Progress toward Treatment Goals: Moderate improvement     Plan:  - Continue individual therapy    Adam Kaye M.D.

## 2024-08-23 ENCOUNTER — OFFICE VISIT (OUTPATIENT)
Dept: BEHAVIORAL HEALTH | Facility: PSYCHIATRIC FACILITY | Age: 49
End: 2024-08-23
Payer: COMMERCIAL

## 2024-08-23 DIAGNOSIS — F43.10 PTSD (POST-TRAUMATIC STRESS DISORDER): ICD-10-CM

## 2024-08-23 DIAGNOSIS — F31.9 BIPOLAR AFFECTIVE DISORDER, REMISSION STATUS UNSPECIFIED (HCC): ICD-10-CM

## 2024-08-23 DIAGNOSIS — F41.1 GAD (GENERALIZED ANXIETY DISORDER): ICD-10-CM

## 2024-08-23 DIAGNOSIS — F31.70 BIPOLAR I DISORDER IN REMISSION (HCC): ICD-10-CM

## 2024-08-23 PROCEDURE — 99214 OFFICE O/P EST MOD 30 MIN: CPT | Performed by: STUDENT IN AN ORGANIZED HEALTH CARE EDUCATION/TRAINING PROGRAM

## 2024-08-23 PROCEDURE — 90834 PSYTX W PT 45 MINUTES: CPT | Mod: GC

## 2024-08-23 RX ORDER — TRAZODONE HYDROCHLORIDE 50 MG/1
50 TABLET, FILM COATED ORAL NIGHTLY PRN
Qty: 30 TABLET | Refills: 3 | Status: SHIPPED | OUTPATIENT
Start: 2024-08-23 | End: 2024-12-21

## 2024-08-23 ASSESSMENT — ENCOUNTER SYMPTOMS
HEADACHES: 0
COUGH: 0
NERVOUS/ANXIOUS: 0
FEVER: 0
INSOMNIA: 0
DEPRESSION: 0
WEIGHT LOSS: 1
HALLUCINATIONS: 0
ABDOMINAL PAIN: 0
DIZZINESS: 0

## 2024-08-23 ASSESSMENT — LIFESTYLE VARIABLES: SUBSTANCE_ABUSE: 0

## 2024-08-23 NOTE — PSYCHOTHERAPY
[facility] PSYCHOTHERAPY NOTE    Today's Date: 08/23/2024  Supervising Attending: Laurence Mercado M.D.     CASE CONCEPTUALIZATION   Therapeutic Intervention(s): Cognitive behavioral therapy, Communication skills, Positive behavior reinforced, Reality-testing, Relaxation exercise, and Self-care skills     Description of Presenting Problem:  Homework progress:  1: Scream into the canyon: Pt yelled and felt a little silly but feels very relieved afterwards. She will try again without feeling embarrassed.   2. Journal: Gratitude journal done weekly during bath time.  3. CBT worksheet no interpersonal conflict with her  and didn't get a chance to use it.  4. Self-praise competition: has been doing it every morning in front of the mirror.    Pt notes improved confidence, her intimacy with her  has improved a lot and they are flirting a lot more.    Pt would like to talk about work today.  Pt feels like she's not doing what she's supposed to do. 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 Pt feels overwhelmed and it distracts her from her current responsibilities.   - Role play to practice communication with her boss.   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   - Boss is very blunt, pt has never given any push back.       Theory for how/why the problem has arisen (BioPsychoSocial Model)  Caring about how others think and difficulty saying no resulting in hesitancy to pushback the extra workload.      Barriers:   Pt needs to develop effective communication and draw clear boundaries.    ASSESSMENT  Significant improvement in self-esteem with practice of homework. Work stress however has impacted her mood due to being yelled at all the time and with past experience it is more personal to the patient.       TREATMENT PLAN  Goal(s) of Treatment: Bring assertiveness into her own life.     Plan to  "Achieve Goal(s):      Progress toward Treatment Goals: Significant improvement     Plan:  - Target for next session: Bridging confidence in work setting into more areas in life.   - Practice \"thick skin\"        "

## 2024-08-23 NOTE — ASSESSMENT & PLAN NOTE
DDX substance induced mood disorder  DDX borderline personality disorder    Last manic episode was in 2000's in addition to last use of substances 2000~ prior to having manic and depressed episodes with 2 psychiatric hospitalizations, was stabilized a few years after at [facility] clinic and has been coming to this clinic ever since.    States that her paranoia in the past (the other year) prior as a potential symptom of 1+ month before having a potential mood episode, however paranoia this year she reports NO sleep disturbances unlike prior times. I Continuing to monitor mood sx, pt has displayed stability, no acute concerns other than slight paranoia as discussed in HPI. She has tolerated Vraylar with success in regards to lifting her previously depressed mood, has done well on increased dose. Will continue to observe and reassess for symptoms in coming visits.     Earlier in 2024 pt has tapered/self tapered off of abilify, lamotrigine, wellbutrin, lexapro. Continues to take trazodone PRN without issue. Pt would like to continue on current dosage.     Establishing therapy with Dr. Kaye has gone very well working through childhood trauma and implications of such. Encouraged to continue seeing weekly therapy.     PCP: Dr. Dias is [place]  Labs ordered last visit as follows/most recent labs 6/3/2024 from [facility] :  TSH: 2.7  CHOLESTEROL, TOTAL: 124  Na: 137  BUN: 13  Cr: 0.64  AST: 49  ALT: 39 (H)  HA1C: 5.2  CBC: ALL WNL     PLAN:  Continue Vraylar 3mg qdaily for mood stabilization  continue Trazodone 50 mg PO PRN as needed nightly for insomnia

## 2024-08-23 NOTE — PROGRESS NOTES
[facility]  Psychotherapy Summary Note    Full therapy note has been documented and is under restricted viewing.  Please see below for summary of today's session.     Date of Service: 08/23/2024  Appointment type: in-office appointment.  Attending:  Laurence Mercado M.D.     Type of session:Individual psychotherapy  Session start time: 3:05  Session stop time: 3:43  Length of time spent face to face with patient: 38 min  Persons in attendance: Patient    SI reported: no  HI reported: no    SUMMARY  Shae Carrasco is a 48 y.o. old female who presents today for regularly scheduled psychotherapy for Psychosocial Conselling (For mood and trauma.)      Symptoms currently being addressed in therapy: interpersonal difficulty and work stress.    Therapeutic Intervention(s): Cognitive behavioral therapy, Communication skills, Conflict resolution skills, Distress tolerance skills, Goal-setting, Interpersonal effectiveness skills, Problem-solving, Role-playing, Self-care skills, and Socialization    CURRENT RISK ASSESSMENT       Suicide: Low       Homicide: Low       Self-Harm: Low       Relapse: Low       Crisis Safety Plan Reviewed Not Indicated    DIAGNOSES/PLAN  Problem List Items Addressed This Visit          Psychiatry Problems    KEEGAN (generalized anxiety disorder)     Intimate relationship anxiety has resolved. Some work relationship anxiety regarding increased work load addressed with problem solving.  - Continue weekly therapy         PTSD (post-traumatic stress disorder)     Improved emotional triggers, enhanced self-esteem and self-efficacy.  - Continue weekly therapy                  Treatment Goal(s)/Objective(s) addressed: Tx plan:  Utilize learned skills to manage mood and emotional suffering more effectively  Learn to successfully challenge & change distortions in thinking  Learn to ameliorate effects of anxiety on life and functioning  Increase behaviors of self-compassion and self-care    Practice assertiveness in working environment.      Progress toward Treatment Goals: Significant improvement     Plan:  - Continue individual therapy    Adam Kaye M.D.

## 2024-08-23 NOTE — PROGRESS NOTES
[facility] PSYCHIATRIC FOLLOW UP NOTE    Evaluation completed by: Saige Payne D.O.   Date of Service: 08/23/2024  Appointment type: Patient was presented for a telehealth consultation via secure and encrypted videoconferencing technology.    Attending:  Dr. Laurence Mercado M.D.   Information below was collected from: Patient    CHIEF COMPLIANT:  Medication Management      HPI:   Shae Carrasco is a 48 y.o. old female who presents today for regularly scheduled follow up for assessment of Medication Management     Patient was last seen on 7/12/2024 at which time plan was to:   INCREASE Vraylar 1.5mg to 3mg qdaily for mood stabilization  continue Trazodone 50 mg PO PRN as needed nightly for insomnia    Since last medication management appointment she has also started in therapy. States that her therapy sessions have been going really well and working through her childhood traumas and paranoia thoughts.  Denies any paranoid thoughts for at least a month. Encouraged to consider potential symptoms this year if they may be more related to prior historic mood episodes in which decreased sleep was a symptom (this year/most recent occurrence of paranoia did NOT include any sleep issues) VS childhood trauma and related issues.     With increase of Vraylar denies all sx, including weight gain, neurological sx, constitutional sx. Was originally concerned about due to her history of gaining weight on psychotropics but she has actively been working to lose weight which she has and is happy about. States she is sleeping well, regular sleep pattern up to 8h/night. Uses trazodone 2x/wk PRN-daily without issue. States that Vraylar continues to lift her previously depressed mood and helped with the paranoia.    Encouraged to contact us if any concerns anytime.    PSYCHIATRIC REVIEW OF SYSTEMS: current symptoms as reported by patient  Depression: Denies overt symptoms of depression such as marked depressed  "mood, anhedonia, sleep disturbances (consistently 6-8h/night)  Carli: Denies overt symptoms of carli such as marked elevated mood, distractability, irritability, grandiosity, flight of ideas   Anxiety/Panic Attacks: Denies overt symptoms of anxiety such as increased restlessness, panic attacks   Trauma:  Patient reports no signs or symptoms indicative of PTSD (despite working through childhood trauma in therapy, denies sx)  Psychosis: Patient reports no signs or symptoms indicative of psychosis - paranoia described in HPI   ADHD: Denies      REVIEW OF SYSTEMS   Review of Systems   Constitutional:  Positive for weight loss (purposeful). Negative for fever.   Respiratory:  Negative for cough.    Cardiovascular:  Negative for chest pain.   Gastrointestinal:  Negative for abdominal pain.   Neurological:  Negative for dizziness and headaches.   Psychiatric/Behavioral:  Negative for depression, hallucinations, substance abuse and suicidal ideas. The patient is not nervous/anxious and does not have insomnia.      Neurologic: no tics, tremors, dyskinesias. The patient denies dizzniess, syncope, falls. Ambulates independently    PSYCHIATRIC EXAMINATION   Musculoskeletal: No abnormal movements noted and wnl  Appearance:  well-developed, well-nourished, appears stated age, fair hygiene, no apparent distress, and appropriately dressed, cooperative, engaged, friendly, and pleasant, nose ring   Thought Process:   linear, coherent, goal-oriented, and organized  Abnormal or Psychotic Thoughts: Denies SI, denies HI, and no overt delusions noted and No overt delusions noted  SI/HI: Denies SI and HI  Speech: regular rate, rhythm, volume, tone, and syntax, coherent, and spontaneous  Mood: \"Good\"  Affect: euthymic and congruent with mood  Orientation: alert and oriented  Recent and Remote Memory: no gross impairment in immediate, recent, or remote memory  Attention Span and Concentration: Intact  Insight/Judgement into symptoms: " good  Neurological Testing (MSSE Score and/or clock drawing): MMSE not performed during this encounter  LMP 12/01/2010     PAST MEDICAL HISTORY  Past Medical History:   Diagnosis Date    Other specified symptom associated with female genital organs     pelvic pain    Pain     pelvic pain    Psychiatric disorder      No Known Allergies  Past Surgical History:   Procedure Laterality Date    SUPRACERVICAL HYSTERECTOMY SCOPE  6/8/2011    Performed by KLAUDIA DREW at [facility]    DILATION AND CURETTAGE  2004      No family history on file.  Social History     Socioeconomic History    Marital status: Single   Tobacco Use    Smoking status: Former     Current packs/day: 1.00     Average packs/day: 1 pack/day for 13.0 years (13.0 ttl pk-yrs)     Types: Cigarettes    Smokeless tobacco: Never   Substance and Sexual Activity    Alcohol use: No    Drug use: No     Past Surgical History:   Procedure Laterality Date    SUPRACERVICAL HYSTERECTOMY SCOPE  6/8/2011    Performed by KLAUDIA DREW at [facility]    DILATION AND CURETTAGE  2004     CURRENT MEDICATIONS    Current Outpatient Medications:     cariprazine (VRAYLAR) 3 MG capsule, Take 1 Capsule by mouth every day for 180 days., Disp: 90 Capsule, Rfl: 1    traZODone (DESYREL) 50 MG Tab, Take 1 Tablet by mouth at bedtime as needed for Sleep (as needed for sleep). Take 1/2 to 1 tablet by mouth at bedtime., Disp: 30 Tablet, Rfl: 3    metformin (GLUCOPHAGE) 1000 MG tablet, TAKE 1 TABLET BY MOUTH TWICE DAILY, Disp: 60 Tablet, Rfl: 3    meloxicam (MOBIC) 15 MG tablet, Take 1 Tablet by mouth every day., Disp: 30 Tablet, Rfl: 0    albuterol 108 (90 Base) MCG/ACT Aero Soln inhalation aerosol, inhale 2 puff by inhalation route  every 4 - 6 hours as needed as needed, Disp: , Rfl:     ibuprofen (MOTRIN) 600 MG TABS, Take 600 mg by mouth every 6 hours as needed., Disp: , Rfl:     Albuterol (VENTOLIN INH), Inhale  by mouth., Disp: , Rfl:    SCREENINGS      5/8/2024     9:30 AM 8/2/2024     3:00 PM   Depression Screen (PHQ-2/PHQ-9)   PHQ-2 Total Score 2 0   PHQ-9 Total Score 9          8/2/2024     3:34 PM 5/8/2024    10:07 AM    KEEGAN-7 ANXIETY SCALE FLOWSHEET   Feeling nervous, anxious, or on edge 2 2   Not being able to stop or control worrying 1 2   Worrying too much about different things 1 2   Trouble relaxing 1 2   Being so restless that it is hard to sit still 1 0   Becoming easily annoyed or irritable 1 1   Feeling afraid as if something awful might happen 1 0   KEEGAN-7 Total Score 8 9   How difficult have these problems made it for you to do your work, take care of things at home, or get along with other people? Somewhat difficult Somewhat difficult     PREVENTATIVE CARE  FOR LABS SEE BELOW   Discussed side effects including headache, drowsiness, dizziness, sedation, dry mouth, constipation, weight gain, orthostatic hypotension, hypertension, dyslipidemia, hyperglycemia, diabetes mellitus, akathisia/restlessness, tremors, muscle rigidity, acute dystonia, tardive dyskinesia etc.       ASSESSMENT  Shae Carrasco is a 48 y.o. old female who presents today for regularly scheduled follow up for assessment of Medication Management    See below for details    [place]  records   reviewed.  No concerns about misuse of controlled substance.    CURRENT RISK ASSESSMENT       Suicide: Low       Homicide: Low       Self-Harm: Low       Relapse: Low       Crisis Safety Plan Reviewed: Not indicated    DIAGNOSES/PLAN  Problem List Items Addressed This Visit          Psychiatry Problems    Bipolar I disorder in remission (HCC)     DDX substance induced mood disorder  DDX borderline personality disorder    Last manic episode was in 2000's in addition to last use of substances 2000~ prior to having manic and depressed episodes with 2 psychiatric hospitalizations, was stabilized a few years after at [facility] clinic and has been coming to this clinic ever  since.    States that her paranoia in the past (the other year) prior as a potential symptom of 1+ month before having a potential mood episode, however paranoia this year she reports NO sleep disturbances unlike prior times. I Continuing to monitor mood sx, pt has displayed stability, no acute concerns other than slight paranoia as discussed in HPI. She has tolerated Vraylar with success in regards to lifting her previously depressed mood, has done well on increased dose. Will continue to observe and reassess for symptoms in coming visits.     Earlier in 2024 pt has tapered/self tapered off of abilify, lamotrigine, wellbutrin, lexapro. Continues to take trazodone PRN without issue. Pt would like to continue on current dosage.     Establishing therapy with Dr. Kaye has gone very well working through childhood trauma and implications of such. Encouraged to continue seeing weekly therapy.     PCP: Dr. Dias is [facility]  Labs ordered last visit as follows/most recent labs 6/3/2024 from [facility] on :  TSH: 2.7  CHOLESTEROL, TOTAL: 124  Na: 137  BUN: 13  Cr: 0.64  AST: 49  ALT: 39 (H)  HA1C: 5.2  CBC: ALL WNL     PLAN:  Continue Vraylar 3mg qdaily for mood stabilization  continue Trazodone 50 mg PO PRN as needed nightly for insomnia                    Medication options, alternatives (including no medications) and medication risks/benefits/side effects were discussed in detail.  The patient was advised to call, message clinician on [employer], or come in to the clinic if symptoms worsen or if questions/issues regarding their medications arise.  The patient verbalized understanding and agreement.    The patient was educated to call 911, call the suicide hotline, or go to the local ER if having thoughts of suicide or homicide.  The patient verbalized understanding and agreement.   The proposed treatment plan was discussed with the patient who was provided the opportunity to ask questions and make suggestions  regarding alternative treatment. Patient verbalized understanding and expressed agreement with the plan.      Follow up in 1 month or sooner as needed.    This appointment was supervised by attending psychiatrist, Dr. Laurence Mercado M.D., who agrees with assessment and treatment plan.  See attending attestation for more details.

## 2024-08-24 NOTE — ASSESSMENT & PLAN NOTE
Improved emotional triggers, enhanced self-esteem and self-efficacy.  - Continue weekly therapy

## 2024-08-24 NOTE — ASSESSMENT & PLAN NOTE
Intimate relationship anxiety has resolved. Some work relationship anxiety regarding increased work load addressed with problem solving.  - Continue weekly therapy

## 2024-08-30 ENCOUNTER — OFFICE VISIT (OUTPATIENT)
Dept: BEHAVIORAL HEALTH | Facility: PSYCHIATRIC FACILITY | Age: 49
End: 2024-08-30
Payer: COMMERCIAL

## 2024-08-30 DIAGNOSIS — F43.10 PTSD (POST-TRAUMATIC STRESS DISORDER): ICD-10-CM

## 2024-08-30 DIAGNOSIS — F41.1 GAD (GENERALIZED ANXIETY DISORDER): ICD-10-CM

## 2024-08-30 NOTE — PSYCHOTHERAPY
[facility] PSYCHOTHERAPY NOTE    Today's Date: 08/30/2024  Supervising Attending: Laurence Mercado M.D.     CASE CONCEPTUALIZATION   Therapeutic Intervention(s): Conflict clarification, Conflict resolution skills, Goal-setting, Positive behavior reinforced, Reality-testing, Relaxation exercise, Role-playing, Self-care skills, and Stressors assessed     Description of Presenting Problem:    Pt is attending her grandson's football game,  Pt's week went really well.   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 The work load has been reduced pt is feeling empowered.    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     Thoughts:  People might think I am poor   People might think I am not worth it.     We went over her current material abundance: 401K, a house, 3 cars, 5 bikes and other experiential luxuries.    Pt has also lost 11 lbs with low carb diet.     Pt doesn't feel guilty about eating anymore, she can enjoy the food.  Pt started turning on her camera when having the conference because she cares less how people think of her look,      Theory for how/why the problem has arisen (BioPsychoSocial Model)    Childhood scarcity trauma contributing to low self-worth and self-consciousness.     Barriers:   Residual trauma    ASSESSMENT  Significant improvement in self-esteem and self-worth with consistent practice of self-praise, and self care. Pt needs to continue her journey of finding the worth inside of herself and build self-efficacy.    TREATMENT PLAN  Goal(s) of Treatment: Improve self-esteem and self-efficacy.    Plan to Achieve Goal(s):   Continue practice homeworks     Progress toward Treatment Goals: Significant improvement     Plan:  - Target for next session: Be herself in the public.

## 2024-08-30 NOTE — ASSESSMENT & PLAN NOTE
Minimal ptsd symptoms. Significantly enhanced self-esteem and self-efficacy.  - Continue weekly therapy

## 2024-08-30 NOTE — ASSESSMENT & PLAN NOTE
Intimate relationship anxiety has resolved. Some work relationship anxiety regarding increased workload successfully resolved with problem solving.  Significant improvement in self-esteem and self-efficacy.  - Continue weekly therapy

## 2024-08-30 NOTE — PROGRESS NOTES
[facility]  Psychotherapy Summary Note    Full therapy note has been documented and is under restricted viewing.  Please see below for summary of today's session.     Date of Service: 08/30/2024  Appointment type: in-office appointment.  Attending:  Laurence Mercado M.D.     Type of session:Individual psychotherapy  Session start time: 3:00  Session stop time: 3:41  Length of time spent face to face with patient: 41 min  Persons in attendance: Patient    SI reported: no  HI reported: no    SUMMARY  Shae Carrasco is a 48 y.o. old female who presents today for regularly scheduled psychotherapy for Psychosocial Conselling (Trauma and Relationships)    Pt has been doing all the assigned homework and can appreciate all the positive changes with a more positive mindset backed by improved self efficacy and self esteem.    Symptoms currently being addressed in therapy: Working relationship, childhood scarcity trauma, relationship with food.    Therapeutic Intervention(s): Cognitive behavioral therapy, Communication skills, Goal-setting, Leisure and recreation skills, Positive behavior reinforced, Problem-solving, Role-playing, and Therapeutic storytelling psychodynamic therapy.    CURRENT RISK ASSESSMENT       Suicide: Low       Homicide: Low       Self-Harm: Low       Relapse: Low       Crisis Safety Plan Reviewed Not Indicated    DIAGNOSES/PLAN  Problem List Items Addressed This Visit          Psychiatry Problems    KEEGAN (generalized anxiety disorder)     Intimate relationship anxiety has resolved. Some work relationship anxiety regarding increased workload successfully resolved with problem solving.  Significant improvement in self-esteem and self-efficacy.  - Continue weekly therapy         PTSD (post-traumatic stress disorder)     Minimal ptsd symptoms. Significantly enhanced self-esteem and self-efficacy.  - Continue weekly therapy                  Treatment Goal(s)/Objective(s) addressed:    Increasing  self esteem   Expanding assertiveness into other area of her life    Tx plan:  Utilize learned skills to manage mood and emotional suffering more effectively  Learn to successfully challenge & change distortions in thinking  Learn to ameliorate effects of anxiety on life and functioning  Increase behaviors of self-compassion and self-care  Practice abundance mindset.     Progress toward Treatment Goals: Significant improvement     Plan:  - Continue individual therapy    Adam Kaye M.D.

## 2024-09-06 ENCOUNTER — OFFICE VISIT (OUTPATIENT)
Dept: BEHAVIORAL HEALTH | Facility: PSYCHIATRIC FACILITY | Age: 49
End: 2024-09-06
Payer: COMMERCIAL

## 2024-09-06 DIAGNOSIS — F43.10 PTSD (POST-TRAUMATIC STRESS DISORDER): ICD-10-CM

## 2024-09-06 DIAGNOSIS — F41.1 GAD (GENERALIZED ANXIETY DISORDER): ICD-10-CM

## 2024-09-06 PROCEDURE — 90834 PSYTX W PT 45 MINUTES: CPT

## 2024-09-06 NOTE — PROGRESS NOTES
[facility]  Psychotherapy Summary Note    Full therapy note has been documented and is under restricted viewing.  Please see below for summary of today's session.     Date of Service: 09/06/2024  Appointment type: in-office appointment.  Attending:  Laurence Mercado M.D.     Type of session:Individual psychotherapy  Session start time: 3:02  Session stop time: 3:40  Length of time spent face to face with patient: 38 min  Persons in attendance: Patient    SI reported: no  HI reported: no    SUMMARY  Shae Carrasco is a 48 y.o. old female who presents today for regularly scheduled psychotherapy for Psychosocial Conselling  Discussed conflict clarification and other effective communication skills.  Discussed anxiety over spontaneity vs planning.     Symptoms currently being addressed in therapy: anxiety, interpersonal difficulty, and poor distress tolerance    Therapeutic Intervention(s): Cognitive behavioral therapy, Conflict clarification, Conflict resolution skills, Exposure exercise, Positive behavior reinforced, and Role-playing    CURRENT RISK ASSESSMENT       Suicide: Low       Homicide: Low       Self-Harm: Low       Relapse: Low       Crisis Safety Plan Reviewed Not Indicated    DIAGNOSES/PLAN  Problem List Items Addressed This Visit          Psychiatry Problems    KEEGAN (generalized anxiety disorder)    PTSD (post-traumatic stress disorder)         Treatment Goal(s)/Objective(s) addressed: Tx plan:  Utilize learned skills to manage mood and emotional suffering more effectively  Learn to successfully challenge & change distortions in thinking  Learn to ameliorate effects of anxiety on life and functioning  Increase behaviors of self-compassion and self-care   Homework: Spontaneity practice      Progress toward Treatment Goals: Significant improvement     Plan:  - Continue individual therapy    Adam Kaye M.D.

## 2024-09-06 NOTE — PSYCHOTHERAPY
[facility] PSYCHOTHERAPY NOTE    Today's Date: 09/06/2024  Supervising Attending: Laurence Mercado M.D.     CASE CONCEPTUALIZATION   Therapeutic Intervention(s): Cognitive behavioral therapy, Conflict clarification, Conflict resolution skills, Distress tolerance skills, Interpersonal effectiveness skills, Limit-setting, and Positive behavior reinforced     Description of Presenting Problem:    Theory for how/why the problem has arisen (BioPsychoSocial Model)     commenting on he shopping habits: Reminds her of being a little girl again. Conflict clarification is needed to express her own emotional needs.    Spontaneous events: Weekend travel - let  do something spontaneous w/o telling pt in advance. CBT of converting negative thought of anticipatory anxiety to curiosity and optimism.       Barriers: Childhood trauma.     ASSESSMENT  Further improved distress tolerance with the utilization of effective communication skills and catastrophizing. Spontaneity represents some unpleasant memories in the past (financial insecurity, drug use and instability).      TREATMENT PLAN  Goal(s) of Treatment:   Improve self-efficacy and self-esteem  Plan to Achieve Goal(s):      Progress toward Treatment Goals: Significant improvement     Plan:  - [place] for next session: Handling spontaneity.

## 2024-09-20 ENCOUNTER — OFFICE VISIT (OUTPATIENT)
Dept: BEHAVIORAL HEALTH | Facility: PSYCHIATRIC FACILITY | Age: 49
End: 2024-09-20
Payer: COMMERCIAL

## 2024-09-20 DIAGNOSIS — F43.10 PTSD (POST-TRAUMATIC STRESS DISORDER): ICD-10-CM

## 2024-09-20 DIAGNOSIS — F41.1 GAD (GENERALIZED ANXIETY DISORDER): ICD-10-CM

## 2024-09-20 PROCEDURE — 90832 PSYTX W PT 30 MINUTES: CPT

## 2024-09-20 NOTE — PSYCHOTHERAPY
"[facility] PSYCHOTHERAPY NOTE    Today's Date: 09/20/2024  Supervising Attending: Laurence Mercado M.D.     CASE CONCEPTUALIZATION   Therapeutic Intervention(s): Cognitive behavioral therapy, Conflict resolution skills, and Distress tolerance skills     Description of Presenting Problem:    Shae Carrasco is a 48 y.o. old female who presents today for regularly scheduled psychotherapy for Psychosocial Conselling (For PTSD)    Pt reports \"everything is going great\". Things are going very well for her and she's been going through her days with no anxiety, mood swings or interpersonal conflicts. Pt has been effectively using the CBT tools she learned to maintain mental health.    Her relationship with her  is also going \"extremely well\". Pt notes her  appears to be more relaxed and fun around her because \"he no longer feels he needs to regulate my emotions.\"  Recently when there's something unplanned happened pt was able to embrace it with an open mind and did not experience anxiety related to the plan change. She's enjoying things and people in her life and is motivated to continue this positive trajectory.     Overall pt reports tremendous improvements in all mood symptoms since starting the therapy. She feels confident that she can use all the tools she learned in therapy clinic to manage her daily stress. She will continue to practice self-care and self-love. Pt feels like she is ready to graduate the therapy clinic.    I commended pt on the consistent work she put in in her mental health journey and congratulated on her excellent progress in enhancing her self-esteem and self-efficacy. I am in agreement with the pt that she's achieved all the milestones and goals we set at the beginning of the therapy and she's eligible for graduation.            Theory for how/why the problem has arisen (BioPsychoSocial Model)    Barriers: None    ASSESSMENT  Pt is eligible for graduation " from the therapy clinic.      TREATMENT PLAN  Goal(s) of Treatment:     Plan to Achieve Goal(s): Graduation     Progress toward Treatment Goals: Significant improvement     Plan:  Graduate therapy clinic.

## 2024-09-20 NOTE — PROGRESS NOTES
" [facility]  Psychotherapy Summary Note    Full therapy note has been documented and is under restricted viewing.  Please see below for summary of today's session.     Date of Service: 09/20/2024  Appointment type: in-office appointment.  Attending:  Laurence Mercado M.D.     Type of session:Individual psychotherapy  Session start time: 2:53pm  Session stop time: 3:14pm  Length of time spent face to face with patient: 21min  Persons in attendance: Patient    SI reported: no  HI reported: no    SUMMARY  Shae Carrasco is a 48 y.o. old female who presents today for regularly scheduled psychotherapy for Psychosocial Conselling (For PTSD)    Pt reports \"everything is going great\". Things are going very well for her and she's been going through her days with no anxiety, mood swings or interpersonal conflicts. Pt has been effectively using the CBT tools she learned to maintain mental health.    Her relationship with her  is also going \"extremely well\". Pt notes her  appears to be more relaxed and fun around her because \"he no longer feels he needs to regulate my emotions.\"  Recently when there's something unplanned happened pt was able to embrace it with an open mind and did not experience anxiety related to the plan change. She's enjoying things and people in her life and is motivated to continue this positive trajectory.     Overall pt reports tremendous improvements in all mood symptoms since starting the therapy. She feels confident that she can use all the tools she learned in therapy clinic to manage her daily stress. She will continue to practice self-care and self-love. Pt feels like she is ready to graduate the therapy clinic.    I commended pt on the consistent work she put in in her mental health journey and congratulated on her excellent progress in enhancing her self-esteem and self-efficacy. I am in agreement with the pt that she's achieved all the milestones and goals we set at " the beginning of the therapy and she's eligible for graduation.    Symptoms currently being addressed in therapy: depression, anxiety, behavioral dysregulation, interpersonal difficulty, and poor distress tolerance    Therapeutic Intervention(s): Cognitive behavioral therapy, Conflict clarification, Conflict resolution skills, Distress tolerance skills, Interpersonal effectiveness skills, Leisure and recreation skills, Positive behavior reinforced, and Review treatment plan    CURRENT RISK ASSESSMENT       Suicide: Low       Homicide: Low       Self-Harm: Low       Relapse: Not applicable       Crisis Safety Plan Reviewed Not Indicated    DIAGNOSES/PLAN  Problem List Items Addressed This Visit          Psychiatry Problems    KEEGAN (generalized anxiety disorder)     Fully resolved, pt reports no longer experiencing anxiety.         PTSD (post-traumatic stress disorder)     Mood swings and interpersonal conflicts resolved via utilizing CBT tools and relaxation techniques. PTSD in remission.              Treatment Goal(s)/Objective(s) addressed: Tx plan:  Utilize learned skills to manage mood and emotional suffering more effectively  Learn to successfully challenge & change distortions in thinking  Learn to ameliorate effects of anxiety on life and functioning  Increase behaviors of self-compassion and self-care     Progress toward Treatment Goals: Significant improvement     Plan:   Graduate Therapy Clinic    Adam Kaye M.D.

## 2024-09-20 NOTE — ASSESSMENT & PLAN NOTE
Mood swings and interpersonal conflicts resolved via utilizing CBT tools and relaxation techniques. PTSD in remission.

## 2024-10-04 ENCOUNTER — APPOINTMENT (OUTPATIENT)
Dept: BEHAVIORAL HEALTH | Facility: PSYCHIATRIC FACILITY | Age: 49
End: 2024-10-04
Payer: COMMERCIAL

## 2024-11-01 ENCOUNTER — APPOINTMENT (OUTPATIENT)
Dept: BEHAVIORAL HEALTH | Facility: PSYCHIATRIC FACILITY | Age: 49
End: 2024-11-01
Payer: COMMERCIAL

## 2024-11-08 DIAGNOSIS — F31.9 BIPOLAR AFFECTIVE DISORDER, REMISSION STATUS UNSPECIFIED (HCC): ICD-10-CM

## 2024-11-13 DIAGNOSIS — F31.9 BIPOLAR AFFECTIVE DISORDER, REMISSION STATUS UNSPECIFIED (HCC): ICD-10-CM

## 2024-11-13 RX ORDER — TRAZODONE HYDROCHLORIDE 50 MG/1
50 TABLET, FILM COATED ORAL NIGHTLY PRN
Qty: 30 TABLET | Refills: 3 | Status: SHIPPED | OUTPATIENT
Start: 2024-11-13 | End: 2025-03-13

## 2024-11-13 RX ORDER — TRAZODONE HYDROCHLORIDE 50 MG/1
100 TABLET, FILM COATED ORAL NIGHTLY PRN
Qty: 90 TABLET | Refills: 1 | Status: SHIPPED | OUTPATIENT
Start: 2024-11-13 | End: 2025-05-12

## 2024-12-20 ENCOUNTER — APPOINTMENT (OUTPATIENT)
Dept: BEHAVIORAL HEALTH | Facility: PSYCHIATRIC FACILITY | Age: 49
End: 2024-12-20
Payer: COMMERCIAL

## 2024-12-20 DIAGNOSIS — F31.70 BIPOLAR I DISORDER IN REMISSION (HCC): ICD-10-CM

## 2024-12-20 DIAGNOSIS — F31.9 BIPOLAR AFFECTIVE DISORDER, REMISSION STATUS UNSPECIFIED (HCC): ICD-10-CM

## 2024-12-20 PROCEDURE — 99214 OFFICE O/P EST MOD 30 MIN: CPT | Performed by: STUDENT IN AN ORGANIZED HEALTH CARE EDUCATION/TRAINING PROGRAM

## 2024-12-20 RX ORDER — TRAZODONE HYDROCHLORIDE 50 MG/1
100 TABLET, FILM COATED ORAL NIGHTLY PRN
Qty: 90 TABLET | Refills: 1 | Status: SHIPPED | OUTPATIENT
Start: 2024-12-20 | End: 2025-06-18

## 2024-12-20 ASSESSMENT — ENCOUNTER SYMPTOMS
COUGH: 0
DEPRESSION: 0
DIZZINESS: 0
WEIGHT LOSS: 1
HEADACHES: 0
FEVER: 0
ABDOMINAL PAIN: 0
NERVOUS/ANXIOUS: 0
HALLUCINATIONS: 0
INSOMNIA: 0

## 2024-12-20 ASSESSMENT — LIFESTYLE VARIABLES: SUBSTANCE_ABUSE: 0

## 2024-12-20 NOTE — ASSESSMENT & PLAN NOTE
Polysubstance use disorder, in sustained remission  DDX substance induced mood disorder  Hx PTSD    Last manic episode was in 2000's in addition to last use of substances 2000~ prior to having manic and depressed episodes with 2 psychiatric hospitalizations, was stabilized a few years after at [facility] clinic and has been coming to this clinic ever since.    Currently denying mood fluctuations, sleep disturbances, NOR paranoia as she has experienced in the past. Now at goal weight and has been sustaining weight loss.    Continuing to monitor mood sx, pt has displayed ongoing stability with Vraylar with success in regards to lifting her previously depressed mood. Will continue to observe and reassess for symptoms in coming visits.     Early 2024 pt has tapered/self tapered off of abilify, lamotrigine, wellbutrin, lexapro. Continues to take trazodone PRN without issue, will increase quantity to up to 2 tabs a night to reflect occasional additional prn usage and also for potential rescue medication to aide with sleep if needed.    Therapy with Dr. Kaye has gone very well and has completed course of therapy. Will be bringing new labs to next year visit.    PCP: Dr. Dias is [facility]  Labs ordered last visit as follows/most recent labs 6/3/2024 from [facility] :  TSH: 2.7  CHOLESTEROL, TOTAL: 124  Na: 137  BUN: 13  Cr: 0.64  AST: 49  ALT: 39 (H)  HA1C: 5.2  CBC: ALL WNL     PLAN:  Continue Vraylar 3mg qdaily for mood stabilization  INCREASE Trazodone  mg PO nightly for insomnia (to reflect how pt is taking)

## 2024-12-20 NOTE — PROGRESS NOTES
[facility] PSYCHIATRIC FOLLOW UP NOTE    Evaluation completed by: Saige Payne D.O.   Date of Service: 12/20/2024  Appointment type: Patient was presented for a telehealth consultation via secure and encrypted videoconferencing technology.    Attending:  Dr. Laurence Mercado M.D.   Information below was collected from: Patient    CHIEF COMPLIANT:  Medication Management      HPI:   Shae Carrasco is a 48 y.o. old female who presents today for regularly scheduled follow up for assessment of Medication Management     Patient was last seen on 8/2024 at which time plan was to:   CONTINUE Vraylar 3mg qdaily for mood stabilization  continue Trazodone 50 mg PO PRN as needed nightly for insomnia      -Patient's mood is stable, has been enjoying life and satisified about such  -Attributes success to both vraylar and therapy with Dr. Kaye in this clinic, has completed therapy  -Denies depression, denies any paranoia  -Denies medication s/e including appetite, weight, neuro sx, mood sx  -Reflected on prior medication regimen, lamotigrine increased appetite which increased weight contributing to depression  -Reports sleep is normal for her at 6~h/night, well rested, denies mood fluctuations  -Utilizes trazodone 50mg a night with good effect, at times utilizes additional 50mg of trazodone about once a week, will be reflecting updated rx for 1-2tabs/night  -Had recent additional sleep study, now no longer needs oxygen (since losing weight - NOW at her GOAL WEIGHT of 125lbs utililzing generic wegove shots monitored with [facility] and has completed such)  -Continue utilizing CPAP with good effect, has worked on sleep hygiene of now I.e. not having TV run all night  -Educated her about CBT-[facility]    -Dr Dias is continue working with her decreasing other medications in light of pts weight loss, will be getting updated labs Jan 2025 instructed to bring new labs in for next visit  "    -Husbands [employer] is doing well, has plans with family and taking care of friends [employer] smoke for kierra, enjoying holidays    PSYCHIATRIC REVIEW OF SYSTEMS: current symptoms as reported by patient  Depression: Denies overt symptoms of depression such as marked depressed mood, anhedonia, sleep disturbances (consistently 6~8h/night)  Carli: Denies overt symptoms of carli such as marked elevated mood, distractability, irritability, grandiosity, flight of ideas   Anxiety/Panic Attacks: Denies overt symptoms of anxiety such as increased restlessness, panic attacks   Trauma:  Patient reports no signs or symptoms indicative of PTSD   Psychosis: Patient reports no signs or symptoms indicative of psychosis   ADHD: Denies      REVIEW OF SYSTEMS   Review of Systems   Constitutional:  Positive for weight loss (purposeful). Negative for fever.   Respiratory:  Negative for cough.    Cardiovascular:  Negative for chest pain.   Gastrointestinal:  Negative for abdominal pain.   Neurological:  Negative for dizziness and headaches.   Psychiatric/Behavioral:  Negative for depression, hallucinations, substance abuse and suicidal ideas. The patient is not nervous/anxious and does not have insomnia.      Neurologic: no tics, tremors, dyskinesias. The patient denies dizzniess, syncope, falls. Ambulates independently    PSYCHIATRIC EXAMINATION   Musculoskeletal: No abnormal movements noted and wnl  Appearance:  well-developed, well-nourished, appears stated age, fair hygiene, no apparent distress, and appropriately dressed, cooperative, engaged, friendly, and pleasant, nose ring   Thought Process:   linear, coherent, goal-oriented, and organized  Abnormal or Psychotic Thoughts: Denies SI, denies HI, and no overt delusions noted and No overt delusions noted  SI/HI: Denies SI and HI  Speech: regular rate, rhythm, volume, tone, and syntax, coherent, and spontaneous  Mood: \"Really good! 8!\"  Affect: euthymic and congruent " with mood  Orientation: alert and oriented  Recent and Remote Memory: no gross impairment in immediate, recent, or remote memory  Attention Span and Concentration: Intact  Insight/Judgement into symptoms: good  Neurological Testing (MSSE Score and/or clock drawing): MMSE not performed during this encounter  LMP 12/01/2010     PAST MEDICAL HISTORY  Past Medical History:   Diagnosis Date    Other specified symptom associated with female genital organs     pelvic pain    Pain     pelvic pain    Psychiatric disorder      No Known Allergies  Past Surgical History:   Procedure Laterality Date    SUPRACERVICAL HYSTERECTOMY SCOPE  6/8/2011    Performed by KLAUDIA DREW at SURGERY SAME DAY [facility] ORS    DILATION AND CURETTAGE  2004      No family history on file.  Social History     Socioeconomic History    Marital status: Single   Tobacco Use    Smoking status: Former     Current packs/day: 1.00     Average packs/day: 1 pack/day for 13.0 years (13.0 ttl pk-yrs)     Types: Cigarettes    Smokeless tobacco: Never   Substance and Sexual Activity    Alcohol use: No    Drug use: No     Past Surgical History:   Procedure Laterality Date    SUPRACERVICAL HYSTERECTOMY SCOPE  6/8/2011    Performed by KLAUDIA DREW at SURGERY SAME DAY [facility] ORS    DILATION AND CURETTAGE  2004     CURRENT MEDICATIONS    Current Outpatient Medications:     traZODone (DESYREL) 50 MG Tab, Take 1 Tablet by mouth at bedtime as needed for Sleep (as needed for sleep) for up to 120 days. Take 1/2 to 1 tablet by mouth at bedtime., Disp: 30 Tablet, Rfl: 3    traZODone (DESYREL) 50 MG Tab, Take 2 Tablets by mouth at bedtime as needed for Sleep (as needed for sleep) for up to 180 days., Disp: 90 Tablet, Rfl: 1    cariprazine (VRAYLAR) 3 MG capsule, Take 1 Capsule by mouth every day for 180 days., Disp: 90 Capsule, Rfl: 1    metformin (GLUCOPHAGE) 1000 MG tablet, TAKE 1 TABLET BY MOUTH TWICE DAILY, Disp: 60 Tablet, Rfl: 3    meloxicam (MOBIC) 15 MG  tablet, Take 1 Tablet by mouth every day., Disp: 30 Tablet, Rfl: 0    albuterol 108 (90 Base) MCG/ACT Aero Soln inhalation aerosol, inhale 2 puff by inhalation route  every 4 - 6 hours as needed as needed, Disp: , Rfl:     ibuprofen (MOTRIN) 600 MG TABS, Take 600 mg by mouth every 6 hours as needed., Disp: , Rfl:     Albuterol (VENTOLIN INH), Inhale  by mouth., Disp: , Rfl:   SCREENINGS      5/8/2024     9:30 AM 8/2/2024     3:00 PM   Depression Screen (PHQ-2/PHQ-9)   PHQ-2 Total Score 2 0   PHQ-9 Total Score 9          8/2/2024     3:34 PM 5/8/2024    10:07 AM    KEEGAN-7 ANXIETY SCALE FLOWSHEET   Feeling nervous, anxious, or on edge 2 2   Not being able to stop or control worrying 1 2   Worrying too much about different things 1 2   Trouble relaxing 1 2   Being so restless that it is hard to sit still 1 0   Becoming easily annoyed or irritable 1 1   Feeling afraid as if something awful might happen 1 0   KEEGAN-7 Total Score 8 9   How difficult have these problems made it for you to do your work, take care of things at home, or get along with other people? Somewhat difficult Somewhat difficult     PREVENTATIVE CARE  FOR LABS SEE BELOW   DENIES ALL AND Discussed side effects including headache, drowsiness, dizziness, sedation, dry mouth, constipation, weight gain, orthostatic hypotension, hypertension, dyslipidemia, hyperglycemia, diabetes mellitus, akathisia/restlessness, tremors, muscle rigidity, acute dystonia, tardive dyskinesia etc.       ASSESSMENT  Shae Carrasco is a 48 y.o. old female who presents today for regularly scheduled follow up for assessment of Medication Management    See below for details    [place]  records   reviewed.  No concerns about misuse of controlled substance.    CURRENT RISK ASSESSMENT       Suicide: Low       Homicide: Low       Self-Harm: Low       Relapse: Low       Crisis Safety Plan Reviewed: Not indicated    DIAGNOSES/PLAN  Problem List Items Addressed This Visit           Psychiatry Problems    Bipolar I disorder in remission (HCC)     Polysubstance use disorder, in sustained remission  DDX substance induced mood disorder  Hx PTSD    Last manic episode was in 2000's in addition to last use of substances 2000~ prior to having manic and depressed episodes with 2 psychiatric hospitalizations, was stabilized a few years after at [facility] clinic and has been coming to this clinic ever since.    Currently denying mood fluctuations, sleep disturbances, NOR paranoia as she has experienced in the past. Now at goal weight and has been sustaining weight loss.    Continuing to monitor mood sx, pt has displayed ongoing stability with Vraylar with success in regards to lifting her previously depressed mood. Will continue to observe and reassess for symptoms in coming visits.     Early 2024 pt has tapered/self tapered off of abilify, lamotrigine, wellbutrin, lexapro. Continues to take trazodone PRN without issue, will increase quantity to up to 2 tabs a night to reflect occasional additional prn usage and also for potential rescue medication to aide with sleep if needed.    Therapy with Dr. Kaye has gone very well and has completed course of therapy. Will be bringing new labs to next year visit.    PCP: Dr. Dias is [facility]  Labs ordered last visit as follows/most recent labs 6/3/2024 from [facility] :  TSH: 2.7  CHOLESTEROL, TOTAL: 124  Na: 137  BUN: 13  Cr: 0.64  AST: 49  ALT: 39 (H)  HA1C: 5.2  CBC: ALL WNL     PLAN:  Continue Vraylar 3mg qdaily for mood stabilization  INCREASE Trazodone  mg PO nightly for insomnia (to reflect how pt is taking)               Medication options, alternatives (including no medications) and medication risks/benefits/side effects were discussed in detail.  The patient was advised to call, message clinician on [employer], or come in to the clinic if symptoms worsen or if questions/issues regarding their medications arise.  The patient verbalized  understanding and agreement.    The patient was educated to call 911, call the suicide hotline, or go to the local ER if having thoughts of suicide or homicide.  The patient verbalized understanding and agreement.   The proposed treatment plan was discussed with the patient who was provided the opportunity to ask questions and make suggestions regarding alternative treatment. Patient verbalized understanding and expressed agreement with the plan.      Follow up in 2 months or sooner as needed.    This appointment was supervised by attending psychiatrist, Dr. Laurence Mercado M.D., who agrees with assessment and treatment plan.  See attending attestation for more details.

## 2025-02-07 ENCOUNTER — APPOINTMENT (OUTPATIENT)
Dept: BEHAVIORAL HEALTH | Facility: PSYCHIATRIC FACILITY | Age: 50
End: 2025-02-07
Payer: COMMERCIAL

## 2025-02-07 VITALS
DIASTOLIC BLOOD PRESSURE: 72 MMHG | OXYGEN SATURATION: 97 % | HEART RATE: 88 BPM | WEIGHT: 134 LBS | BODY MASS INDEX: 23.74 KG/M2 | SYSTOLIC BLOOD PRESSURE: 114 MMHG | HEIGHT: 63 IN

## 2025-02-07 DIAGNOSIS — F31.70 BIPOLAR I DISORDER IN REMISSION (HCC): ICD-10-CM

## 2025-02-07 RX ORDER — DOXEPIN 3 MG/1
3 TABLET, FILM COATED ORAL NIGHTLY
Qty: 90 TABLET | Refills: 1 | Status: SHIPPED | OUTPATIENT
Start: 2025-02-07 | End: 2025-08-06

## 2025-02-07 ASSESSMENT — ENCOUNTER SYMPTOMS
ABDOMINAL PAIN: 0
COUGH: 0
INSOMNIA: 0
HALLUCINATIONS: 0
WEIGHT LOSS: 1
DIZZINESS: 0
FEVER: 0
HEADACHES: 0
NERVOUS/ANXIOUS: 0
DEPRESSION: 0

## 2025-02-07 ASSESSMENT — LIFESTYLE VARIABLES: SUBSTANCE_ABUSE: 0

## 2025-02-07 NOTE — PROGRESS NOTES
[facility] PSYCHIATRIC FOLLOW UP NOTE    Evaluation completed by: Saige Payne D.O.   Date of Service: 02/07/2025  Appointment type: In person  Attending:  Dr. Laurence Mercado M.D.   Information below was collected from: Patient    CHIEF COMPLIANT:  Medication Management      HPI:   Shae Carrasco is a 48 y.o. old female who presents today for regularly scheduled follow up for assessment of Medication Management     Patient was last seen on 12/2024 at which time plan was to:   Continue Vraylar 3mg qdaily for mood stabilization  INCREASE Trazodone  mg PO nightly for insomnia (to reflect how pt is taking)    -Complaints of nighttime awakenings (sleeps 8AM-3AM normally, but has been waking up at 12AM) for past few months/since winter  -Sleep study done 2 months ago, found no significant concerns at this time  -Hx of nasal trauma and long standing all year round allergy, recommended for pt to see ENT/ and f/u with sleep study to see if any impacts causing sleep disturbances  -While waiting for any potential rule outs of medical causes and asking further questions about sleep (denies pain or nasal issues waking her up), will switch patient from trazodone to doxepin to assist for sleep. Pts states she would like to trial off trazodone because she has been on it for over 20+ years and feels that it may not be helpful for her anymore.    -Patient's mood is stable, has been enjoying life and satisified about such  -Attributes success to both vraylar and therapy with Dr. Kaye in this clinic, has completed therapy (Would like to  transfer to Dr Kaye for medication management after this writer leaves)  -Denies depression, denies any paranoia, denies anxiety  -Denies medication s/e including appetite, weight, neuro sx, mood sx    -Continue utilizing CPAP with good effect, has worked on sleep hygiene of now I.e. 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 Has no concerns.    PSYCHIATRIC REVIEW OF SYSTEMS: current symptoms as reported by patient  Depression: DENIES overt symptoms of depression such as marked depressed mood, anhedonia. However in the past few months has been having midnight awakenings   Carli: Denies overt symptoms of carli such as marked elevated mood, distractability, irritability, grandiosity, flight of ideas   Anxiety/Panic Attacks: Denies overt symptoms of anxiety such as increased restlessness, panic attacks   Trauma:  Patient reports no signs or symptoms indicative of PTSD   Psychosis: Patient reports no signs or symptoms indicative of psychosis   ADHD: Denies inattentive and hyperactive sx      REVIEW OF SYSTEMS   Review of Systems   Constitutional:  Positive for weight loss (purposeful). Negative for fever.   Respiratory:  Negative for cough.    Cardiovascular:  Negative for chest pain.   Gastrointestinal:  Negative for abdominal pain.   Neurological:  Negative for dizziness and headaches.   Psychiatric/Behavioral:  Negative for depression, hallucinations, substance abuse and suicidal ideas. The patient is not nervous/anxious and does not have insomnia.      Neurologic: no tics, tremors, dyskinesias. The patient denies dizzniess, syncope, falls. Ambulates independently    PSYCHIATRIC EXAMINATION   Musculoskeletal: No abnormal movements noted and wnl  Appearance:  well-developed, well-nourished, appears stated age, fair hygiene, no apparent distress, and appropriately dressed, cooperative, engaged, friendly, and pleasant, nose ring   Thought Process:   linear, coherent, goal-oriented, and organized  Abnormal or Psychotic Thoughts: Denies SI, denies HI, and no overt delusions noted and No overt delusions noted  SI/HI: Denies SI and HI  Speech:  "regular rate, rhythm, volume, tone, and syntax, coherent, and spontaneous  Mood: \"I'm doing great!\"  Affect: bright  and congruent with mood  Orientation: alert and oriented  Recent and Remote Memory: no gross impairment in immediate, recent, or remote memory  Attention Span and Concentration: Intact  Insight/Judgement into symptoms: good  Neurological Testing (MSSE Score and/or clock drawing): MMSE not performed during this encounter  /72 (BP Location: Left arm, Patient Position: Sitting, BP Cuff Size: Adult)   Pulse 88   Ht 1.6 m (5' 3\")   Wt 60.8 kg (134 lb) Comment: waist circum: 33in  LMP 12/01/2010   SpO2 97%   BMI 23.74 kg/m²     PAST MEDICAL HISTORY  Past Medical History:   Diagnosis Date    Other specified symptom associated with female genital organs     pelvic pain    Pain     pelvic pain    Psychiatric disorder      No Known Allergies  Past Surgical History:   Procedure Laterality Date    SUPRACERVICAL HYSTERECTOMY SCOPE  6/8/2011    Performed by KLAUDIA DREW at [facility]    DILATION AND CURETTAGE  2004      No family history on file.  Social History     Socioeconomic History    Marital status: Single   Tobacco Use    Smoking status: Former     Current packs/day: 1.00     Average packs/day: 1 pack/day for 13.0 years (13.0 ttl pk-yrs)     Types: Cigarettes    Smokeless tobacco: Never   Substance and Sexual Activity    Alcohol use: No    Drug use: No     Past Surgical History:   Procedure Laterality Date    SUPRACERVICAL HYSTERECTOMY SCOPE  6/8/2011    Performed by KLAUDIA DREW at [facility] DAY [facility]S    DILATION AND CURETTAGE  2004     CURRENT MEDICATIONS    Current Outpatient Medications:     traZODone (DESYREL) 50 MG Tab, Take 2 Tablets by mouth at bedtime as needed for Sleep (as needed for sleep) for up to 180 days., Disp: 90 Tablet, Rfl: 1    cariprazine (VRAYLAR) 3 MG capsule, Take 1 Capsule by mouth every day for 180 days., Disp: 90 Capsule, Rfl: 1    " "traZODone (DESYREL) 50 MG Tab, Take 1 Tablet by mouth at bedtime as needed for Sleep (as needed for sleep) for up to 120 days. Take 1/2 to 1 tablet by mouth at bedtime., Disp: 30 Tablet, Rfl: 3    metformin (GLUCOPHAGE) 1000 MG tablet, TAKE 1 TABLET BY MOUTH TWICE DAILY, Disp: 60 Tablet, Rfl: 3    meloxicam (MOBIC) 15 MG tablet, Take 1 Tablet by mouth every day., Disp: 30 Tablet, Rfl: 0    albuterol 108 (90 Base) MCG/ACT Aero Soln inhalation aerosol, inhale 2 puff by inhalation route  every 4 - 6 hours as needed as needed, Disp: , Rfl:     ibuprofen (MOTRIN) 600 MG TABS, Take 600 mg by mouth every 6 hours as needed., Disp: , Rfl:     Albuterol (VENTOLIN INH), Inhale  by mouth., Disp: , Rfl:   SCREENINGS      5/8/2024     9:30 AM 8/2/2024     3:00 PM   Depression Screen (PHQ-2/PHQ-9)   PHQ-2 Total Score 2 0   PHQ-9 Total Score 9          8/2/2024     3:34 PM 5/8/2024    10:07 AM    KEEGAN-7 ANXIETY SCALE FLOWSHEET   Feeling nervous, anxious, or on edge 2 2   Not being able to stop or control worrying 1 2   Worrying too much about different things 1 2   Trouble relaxing 1 2   Being so restless that it is hard to sit still 1 0   Becoming easily annoyed or irritable 1 1   Feeling afraid as if something awful might happen 1 0   KEEGAN-7 Total Score 8 9   How difficult have these problems made it for you to do your work, take care of things at home, or get along with other people? Somewhat difficult Somewhat difficult     PREVENTATIVE CARE  FOR LABS SEE BELOW Encounter Vitals  Blood Pressure: 114/72  Pulse: 88  Pulse Oximetry: 97 %  Weight: 60.8 kg (134 lb) (waist circum: 33in)  Height: 160 cm (5' 3\")  BMI (Calculated): 23.74 DENIES ALL AND Discussed side effects including headache, drowsiness, dizziness, sedation, dry mouth, constipation, weight gain, orthostatic hypotension, hypertension, dyslipidemia, hyperglycemia, diabetes mellitus, akathisia/restlessness, tremors, muscle rigidity, acute dystonia, tardive dyskinesia etc. "       ASSESSMENT  Shae Carrasco is a 48 y.o. old female who presents today for regularly scheduled follow up for assessment of Medication Management    See below for details    [place]  records   reviewed.  No concerns about misuse of controlled substance.    CURRENT RISK ASSESSMENT       Suicide: Low       Homicide: Low       Self-Harm: Low       Relapse: Low       Crisis Safety Plan Reviewed: Not indicated    DIAGNOSES/PLAN  Problem List Items Addressed This Visit          Psychiatry Problems    Bipolar I disorder in remission (HCC)     Polysubstance use disorder, in sustained remission  DDX substance induced mood disorder  Hx PTSD    Last manic episode was in 2000's in addition to last use of substances 2000~ prior to having manic and depressed episodes with 2 psychiatric hospitalizations, was stabilized a few years after at [facility] clinic and has been coming to this clinic ever since.    Currently denying mood fluctuations, paranoia as she has experienced in the past. Now at goal weight and has been sustaining weight loss. Switching sleep assistance aide from trazodone to doxepin.     Continuing to monitor mood sx, pt has displayed ongoing stability with Vraylar with success in regards to lifting her previously depressed mood. Will continue to observe and reassess for symptoms in coming visits. Continues to do well.    Early 2024 pt has tapered/self tapered off of abilify, lamotrigine, wellbutrin, lexapro. Continues to take trazodone PRN without issue, will increase quantity to up to 2 tabs a night to reflect occasional additional prn usage and also for potential rescue medication to aide with sleep if needed.    Therapy with Dr. Kaye has gone very well and has completed course of therapy. Will be bringing new labs in future visits.    PCP: Dr. Dias is [place]  Labs ordered last visit as follows/most recent labs 6/3/2024 from [facility] :  TSH: 2.7  CHOLESTEROL, TOTAL: 124  Na: 137  BUN:  13  Cr: 0.64  AST: 49  ALT: 39 (H)  HA1C: 5.2  CBC: ALL WNL     PLAN:  Continue Vraylar 3mg qdaily for mood stabilization  DISCONTINUE Trazodone  mg PO nightly for insomnia (to reflect how pt is taking)  START doxepin 3mg qnightly for sleep               Medication options, alternatives (including no medications) and medication risks/benefits/side effects were discussed in detail.  The patient was advised to call[facility], or come in to the clinic if symptoms worsen or if questions/issues regarding their medications arise.  The patient verbalized understanding and agreement.    The patient was educated to call 911, call the suicide hotline, or go to the local ER if having thoughts of suicide or homicide.  The patient verbalized understanding and agreement.   The proposed treatment plan was discussed with the patient who was provided the opportunity to ask questions and make suggestions regarding alternative treatment. Patient verbalized understanding and expressed agreement with the plan.      Follow up in 2 months or sooner as needed.    This appointment was supervised by attending psychiatrist, Dr. Laurence Mercado M.D., who agrees with assessment and treatment plan.  See attending attestation for more details.

## 2025-02-07 NOTE — ASSESSMENT & PLAN NOTE
Polysubstance use disorder, in sustained remission  DDX substance induced mood disorder  Hx PTSD    Last manic episode was in 2000's in addition to last use of substances 2000~ prior to having manic and depressed episodes with 2 psychiatric hospitalizations, was stabilized a few years after at [facility] clinic and has been coming to this clinic ever since.    Currently denying mood fluctuations, paranoia as she has experienced in the past. Now at goal weight and has been sustaining weight loss. Switching sleep assistance aide from trazodone to doxepin.     Continuing to monitor mood sx, pt has displayed ongoing stability with Vraylar with success in regards to lifting her previously depressed mood. Will continue to observe and reassess for symptoms in coming visits. Continues to do well.    Early 2024 pt has tapered/self tapered off of abilify, lamotrigine, wellbutrin, lexapro. Continues to take trazodone PRN without issue, will increase quantity to up to 2 tabs a night to reflect occasional additional prn usage and also for potential rescue medication to aide with sleep if needed.    Therapy with Dr. Kaye has gone very well and has completed course of therapy. Will be bringing new labs in future visits.    PCP: Dr. Dias is [facility]  Labs ordered last visit as follows/most recent labs 6/3/2024 from [facility]:  TSH: 2.7  CHOLESTEROL, TOTAL: 124  Na: 137  BUN: 13  Cr: 0.64  AST: 49  ALT: 39 (H)  HA1C: 5.2  CBC: ALL WNL     PLAN:  Continue Vraylar 3mg qdaily for mood stabilization  DISCONTINUE Trazodone  mg PO nightly for insomnia (to reflect how pt is taking)  START doxepin 3mg qnightly for sleep

## 2025-03-10 RX ORDER — TRAZODONE HYDROCHLORIDE 50 MG/1
TABLET ORAL
Qty: 90 TABLET | Refills: 1 | Status: SHIPPED | OUTPATIENT
Start: 2025-03-10

## 2025-03-11 NOTE — PROGRESS NOTES
Patient has been unable to get Doxepin 3 mg as prescribed due to insurance rejecting coverage.  Since Trazodone 100 mg had been effective with helping with sleep onset, but still with issues waking a couple times during the night without side effects, will increase dosing to Trazodone 125 mg or 150 mg at bedtime.

## 2025-04-20 DIAGNOSIS — F31.70 BIPOLAR I DISORDER IN REMISSION (HCC): ICD-10-CM

## 2025-05-02 ENCOUNTER — OFFICE VISIT (OUTPATIENT)
Dept: BEHAVIORAL HEALTH | Facility: PSYCHIATRIC FACILITY | Age: 50
End: 2025-05-02
Payer: COMMERCIAL

## 2025-05-02 VITALS
DIASTOLIC BLOOD PRESSURE: 64 MMHG | SYSTOLIC BLOOD PRESSURE: 102 MMHG | HEART RATE: 64 BPM | BODY MASS INDEX: 23.04 KG/M2 | HEIGHT: 63 IN | WEIGHT: 130 LBS

## 2025-05-02 DIAGNOSIS — F31.70 BIPOLAR I DISORDER IN REMISSION (HCC): ICD-10-CM

## 2025-05-02 PROCEDURE — 99999 PR NO CHARGE: CPT | Performed by: STUDENT IN AN ORGANIZED HEALTH CARE EDUCATION/TRAINING PROGRAM

## 2025-05-02 RX ORDER — CARIPRAZINE 4.5 MG/1
4.5 CAPSULE, GELATIN COATED ORAL DAILY
Qty: 180 CAPSULE | Refills: 1 | Status: SHIPPED | OUTPATIENT
Start: 2025-05-02 | End: 2025-05-06 | Stop reason: SDUPTHER

## 2025-05-02 RX ORDER — TRAZODONE HYDROCHLORIDE 150 MG/1
150 TABLET ORAL NIGHTLY
Qty: 90 TABLET | Refills: 1 | Status: SHIPPED | OUTPATIENT
Start: 2025-05-02 | End: 2025-10-29

## 2025-05-02 ASSESSMENT — ENCOUNTER SYMPTOMS
INSOMNIA: 0
HEADACHES: 0
FEVER: 0
HALLUCINATIONS: 0
NERVOUS/ANXIOUS: 0
DIZZINESS: 0
ABDOMINAL PAIN: 0
WEIGHT LOSS: 1
COUGH: 0
DEPRESSION: 0

## 2025-05-02 ASSESSMENT — LIFESTYLE VARIABLES: SUBSTANCE_ABUSE: 0

## 2025-05-02 NOTE — PROGRESS NOTES
[facility] PSYCHIATRIC FOLLOW UP NOTE    Evaluation completed by: Saige Payne D.O.   Date of Service: 05/02/2025  Appointment type: In person  Attending:  Dr. Laurence Mercado M.D.   Information below was collected from: Patient    CHIEF COMPLIANT:  Medication Management      HPI:   Shae Carrasco is a 49 y.o. old female who presents today for regularly scheduled follow up for assessment of Medication Management     Patient was last seen on 2/2025 at which time plan was to:   PLAN:  Continue Vraylar 3mg qdaily for mood stabilization  DISCONTINUE Trazodone  mg PO nightly for insomnia (to reflect how pt is taking)  START doxepin 3mg qnightly for sleep    Chart shows 3/10/25 this writer's supervisor Dr. Mercado re-started trazodone due to insurance not covering doxepin.    -States that she has been doing well until the last month has been feeling some depression detailed below in ROS depression   -No longer experiencing nighttime wakenings, CPAP recent sleep study and retunement has gone well   -Went through significant discussion and spoke at length about previous medications namely lexapro and its benefit to her depression in the past versus expectations and slight increase of vraylar. After much discussion pt made informed decision to increase vraylar as monotherapy to trial, will return in 2 weeks to f/u with Dr. Kaye    -Attributes overall mood stability success (outside of this past month) to both vraylar and therapy with Dr. Kaye previous therapist in this clinic, will establish her with medication management care due to prior positive therapeutic relationship  -Denies any paranoia, denies anxiety  -Denies medication s/e including appetite, weight, neuro sx, mood sx  -Denies all other interpersonal stressors, work, stressors, relationship stressors. Denies any changes to medication, no other medical changes or symptoms or new dxs    -Husbands [employer] is doing well,  relationship is solid and supportive  -Is aware of this writer taking leave, is comfortable contacting clinic and going to ER in an emergency. Has no concerns.    PSYCHIATRIC REVIEW OF SYSTEMS: current symptoms as reported by patient  Depression: Reports depressed mood, anhedonia, some irritability towards partner that pt is aware of and regrets. Denies changes of appetite. Reports hyperosmia, sleep 7-8hrs/night, 1 hr nap/day (would sleep more if given the opportunity), some slight anhedonia, however she has still been able to maintain all of her duties it takes more energy from her  Carli: DENIES overt symptoms of carli such as marked elevated mood, distractability, grandiosity, flight of ideas   Anxiety/Panic Attacks: Denies overt symptoms of anxiety such as increased restlessness, panic attacks   Trauma:  Patient reports no signs or symptoms indicative of PTSD   Psychosis: Patient reports no signs or symptoms indicative of psychosis   ADHD: Denies inattentive and hyperactive sx      REVIEW OF SYSTEMS   Review of Systems   Constitutional:  Positive for weight loss (purposeful). Negative for fever.   Respiratory:  Negative for cough.    Cardiovascular:  Negative for chest pain.   Gastrointestinal:  Negative for abdominal pain.   Neurological:  Negative for dizziness and headaches.   Psychiatric/Behavioral:  Negative for depression, hallucinations, substance abuse and suicidal ideas. The patient is not nervous/anxious and does not have insomnia.      Neurologic: no tics, tremors, dyskinesias. The patient denies dizzniess, syncope, falls. Ambulates independently    PSYCHIATRIC EXAMINATION   Musculoskeletal: No abnormal movements noted and wnl  Appearance:  well-developed, well-nourished, appears stated age, fair hygiene, no apparent distress, and appropriately dressed, cooperative, engaged, friendly, and pleasant, nose ring   Thought Process:   linear, coherent, goal-oriented, and organized  Abnormal or Psychotic  "Thoughts: Denies SI, denies HI, and no overt delusions noted and No overt delusions noted  SI/HI: Denies SI and HI  Speech: regular rate, rhythm, volume, tone, and syntax, coherent, and spontaneous  Mood: \"5/10\"  Affect: euthymic and congruent with mood  Orientation: alert and oriented  Recent and Remote Memory: no gross impairment in immediate, recent, or remote memory  Attention Span and Concentration: Intact  Insight/Judgement into symptoms: good  Neurological Testing (MSSE Score and/or clock drawing): MMSE not performed during this encounter  /64 (BP Location: Left arm, Patient Position: Sitting, BP Cuff Size: Adult)   Pulse 64   Ht 1.6 m (5' 3\")   Wt 59 kg (130 lb) Comment: waist circumference: 34in  LMP 12/01/2010   BMI 23.03 kg/m²     PAST MEDICAL HISTORY  Past Medical History:   Diagnosis Date    Other specified symptom associated with female genital organs     pelvic pain    Pain     pelvic pain    Psychiatric disorder      No Known Allergies  Past Surgical History:   Procedure Laterality Date    LAPAROSCOPIC SUPRACERVICAL HYSTERECTOMY  6/8/2011    Performed by KLAUDIA DREW at [facility]    DILATION AND CURETTAGE  2004      No family history on file.  Social History     Socioeconomic History    Marital status: Single   Tobacco Use    Smoking status: Former     Current packs/day: 1.00     Average packs/day: 1 pack/day for 13.0 years (13.0 ttl pk-yrs)     Types: Cigarettes    Smokeless tobacco: Never   Substance and Sexual Activity    Alcohol use: No    Drug use: No     Past Surgical History:   Procedure Laterality Date    LAPAROSCOPIC SUPRACERVICAL HYSTERECTOMY  6/8/2011    Performed by KLAUDIA DREW at [facility]    DILATION AND CURETTAGE  2004     CURRENT MEDICATIONS    Current Outpatient Medications:     cariprazine (VRAYLAR) 3 MG capsule, Take 1 Capsule by mouth every day for 180 days., Disp: 90 Capsule, Rfl: 1    traZODone (DESYREL) 50 MG Tab, Take " "2 1/2 tablets (125 mg) by mouth at bedtime.  If needed may increase to take 3 tablets (150 mg) at bedtime, Disp: 90 Tablet, Rfl: 1    metformin (GLUCOPHAGE) 1000 MG tablet, TAKE 1 TABLET BY MOUTH TWICE DAILY, Disp: 60 Tablet, Rfl: 3    meloxicam (MOBIC) 15 MG tablet, Take 1 Tablet by mouth every day., Disp: 30 Tablet, Rfl: 0    albuterol 108 (90 Base) MCG/ACT Aero Soln inhalation aerosol, inhale 2 puff by inhalation route  every 4 - 6 hours as needed as needed, Disp: , Rfl:     ibuprofen (MOTRIN) 600 MG TABS, Take 600 mg by mouth every 6 hours as needed., Disp: , Rfl:     Albuterol (VENTOLIN INH), Inhale  by mouth., Disp: , Rfl:   SCREENINGS      5/8/2024     9:30 AM 8/2/2024     3:00 PM   Depression Screen (PHQ-2/PHQ-9)   PHQ-2 Total Score 2 0   PHQ-9 Total Score 9          8/2/2024     3:34 PM 5/8/2024    10:07 AM    KEEGAN-7 ANXIETY SCALE FLOWSHEET   Feeling nervous, anxious, or on edge 2 2   Not being able to stop or control worrying 1 2   Worrying too much about different things 1 2   Trouble relaxing 1 2   Being so restless that it is hard to sit still 1 0   Becoming easily annoyed or irritable 1 1   Feeling afraid as if something awful might happen 1 0   KEEGAN-7 Total Score 8 9   How difficult have these problems made it for you to do your work, take care of things at home, or get along with other people? Somewhat difficult Somewhat difficult     PREVENTATIVE CARE  FOR LABS SEE BELOW Encounter Vitals  Blood Pressure: 102/64  Pulse: 64  Pulse Oximetry:  (unable to obtain)  Weight: 59 kg (130 lb) (waist circumference: 34in)  Height: 160 cm (5' 3\")  BMI (Calculated): 23.03 DENIES ALL AND Discussed side effects including headache, drowsiness, dizziness, sedation, dry mouth, constipation, weight gain, orthostatic hypotension, hypertension, dyslipidemia, hyperglycemia, diabetes mellitus, akathisia/restlessness, tremors, muscle rigidity, acute dystonia, tardive dyskinesia etc.       ASSESSMENT  Shae Carrasco is " a 49 y.o. old female who presents today for regularly scheduled follow up for assessment of Medication Management    See below for details    [place]  records   reviewed.  No concerns about misuse of controlled substance.    CURRENT RISK ASSESSMENT       Suicide: Low       Homicide: Low       Self-Harm: Low       Relapse: Low       Crisis Safety Plan Reviewed: Not indicated    DIAGNOSES/PLAN  Problem List Items Addressed This Visit          Psychiatry Problems    Bipolar I disorder in remission (HCC)    Polysubstance use disorder, in sustained remission  DDX substance induced mood disorder  Hx PTSD    Last manic episode was in 2000's in addition to last use of substances 2000~ prior to having manic and depressed episodes with 2 psychiatric hospitalizations, was stabilized a few years after at [facility] clinic and has been coming to this clinic ever since.    Currently expresses depression wtihin the past month, will be increasing vraylar trial today, can consider restarting lexapro as she responded well in the future. DENIES paranoia as she has experienced in the past. Now at goal weight and has been sustaining weight loss. Continuing sleep aide back to trazodone with good effect.    Continuing to monitor mood sx, pt has displayed ongoing stability with Vraylar with success in regards to lifting her previously depressed mood historically and will trial if slight increase may help with current depression. Will continue to observe and reassess for symptoms in coming visits.    History of early 2024 pt has tapered/self tapered off of abilify, lamotrigine, wellbutrin, lexapro.     Therapy with Dr. Kaye has gone very well and has completed course of therapy. Will be bringing new labs in future visits, continue to monitor cholesterol that PCP has her on atorvastatin for.    PCP: Dr. Dias is [facility]  Labs ordered last visit as follows/most recent labs 6/3/2024 from [facility] on :  TSH: 2.7  CHOLESTEROL, TOTAL:  124  Na: 137  BUN: 13  Cr: 0.64  AST: 49  ALT: 39 (H)  HA1C: 5.2  CBC: ALL WNL     PLAN:  INCREASE Vraylar 3mg to 4.5mg qdaily for mood stabilization  CONTINUE Trazodone 150 mg PO nightly for insomnia (to reflect how pt is taking)  DISCONTINUE doxepin 3mg qnightly for sleep (insurance did not cover)               Medication options, alternatives (including no medications) and medication risks/benefits/side effects were discussed in detail.  The patient was advised to call, message clinician on [employer], or come in to the clinic if symptoms worsen or if questions/issues regarding their medications arise.  The patient verbalized understanding and agreement.    The patient was educated to call 911, call the suicide hotline, or go to the local ER if having thoughts of suicide or homicide.  The patient verbalized understanding and agreement.   The proposed treatment plan was discussed with the patient who was provided the opportunity to ask questions and make suggestions regarding alternative treatment. Patient verbalized understanding and expressed agreement with the plan.      Follow up in 2 weeks or sooner as needed.    This appointment was supervised by attending psychiatrist, Dr. Laurence Mercado M.D., who agrees with assessment and treatment plan.  See attending attestation for more details.

## 2025-05-02 NOTE — ASSESSMENT & PLAN NOTE
Polysubstance use disorder, in sustained remission  DDX substance induced mood disorder  Hx PTSD    Last manic episode was in 2000's in addition to last use of substances 2000~ prior to having manic and depressed episodes with 2 psychiatric hospitalizations, was stabilized a few years after at [facility] and has been coming to this clinic ever since.    Currently expresses depression wtihin the past month, will be increasing vraylar trial today, can consider restarting lexapro as she responded well in the future. DENIES paranoia as she has experienced in the past. Now at goal weight and has been sustaining weight loss. Continuing sleep aide back to trazodone with good effect.    Continuing to monitor mood sx, pt has displayed ongoing stability with Vraylar with success in regards to lifting her previously depressed mood historically and will trial if slight increase may help with current depression. Will continue to observe and reassess for symptoms in coming visits.    History of early 2024 pt has tapered/self tapered off of abilify, lamotrigine, wellbutrin, lexapro.     Therapy with Dr. Kaye has gone very well and has completed course of therapy. Will be bringing new labs in future visits, continue to monitor cholesterol that PCP has her on atorvastatin for.    PCP: Dr. Dias is [facility]  Labs ordered last visit as follows/most recent labs 6/3/2024 from [facility] on :  TSH: 2.7  CHOLESTEROL, TOTAL: 124  Na: 137  BUN: 13  Cr: 0.64  AST: 49  ALT: 39 (H)  HA1C: 5.2  CBC: ALL WNL     PLAN:  INCREASE Vraylar 3mg to 4.5mg qdaily for mood stabilization  CONTINUE Trazodone 150 mg PO nightly for insomnia (to reflect how pt is taking)  DISCONTINUE doxepin 3mg qnightly for sleep (insurance did not cover)

## 2025-05-06 DIAGNOSIS — F31.70 BIPOLAR I DISORDER IN REMISSION (HCC): ICD-10-CM

## 2025-05-06 RX ORDER — CARIPRAZINE 4.5 MG/1
1 CAPSULE, GELATIN COATED ORAL DAILY
Qty: 30 CAPSULE | Refills: 3 | Status: SHIPPED | OUTPATIENT
Start: 2025-05-06

## 2025-06-06 ENCOUNTER — OFFICE VISIT (OUTPATIENT)
Dept: BEHAVIORAL HEALTH | Facility: PSYCHIATRIC FACILITY | Age: 50
End: 2025-06-06
Payer: COMMERCIAL

## 2025-06-06 VITALS
HEIGHT: 63 IN | WEIGHT: 128.8 LBS | HEART RATE: 77 BPM | DIASTOLIC BLOOD PRESSURE: 62 MMHG | SYSTOLIC BLOOD PRESSURE: 95 MMHG | BODY MASS INDEX: 22.82 KG/M2

## 2025-06-06 DIAGNOSIS — F41.1 GAD (GENERALIZED ANXIETY DISORDER): ICD-10-CM

## 2025-06-06 DIAGNOSIS — F31.70 BIPOLAR I DISORDER IN REMISSION (HCC): Primary | ICD-10-CM

## 2025-06-06 DIAGNOSIS — G47.33 OSA ON CPAP: ICD-10-CM

## 2025-06-06 DIAGNOSIS — F43.10 PTSD (POST-TRAUMATIC STRESS DISORDER): ICD-10-CM

## 2025-06-06 DIAGNOSIS — N95.1 PERIMENOPAUSE: ICD-10-CM

## 2025-06-06 PROCEDURE — 99999 PR NO CHARGE: CPT

## 2025-06-06 ASSESSMENT — ANXIETY QUESTIONNAIRES
3. WORRYING TOO MUCH ABOUT DIFFERENT THINGS: NOT AT ALL
2. NOT BEING ABLE TO STOP OR CONTROL WORRYING: NOT AT ALL
GAD7 TOTAL SCORE: 0
7. FEELING AFRAID AS IF SOMETHING AWFUL MIGHT HAPPEN: NOT AT ALL
6. BECOMING EASILY ANNOYED OR IRRITABLE: NOT AT ALL
5. BEING SO RESTLESS THAT IT IS HARD TO SIT STILL: NOT AT ALL
4. TROUBLE RELAXING: NOT AT ALL
1. FEELING NERVOUS, ANXIOUS, OR ON EDGE: NOT AT ALL

## 2025-06-06 ASSESSMENT — PATIENT HEALTH QUESTIONNAIRE - PHQ9
CLINICAL INTERPRETATION OF PHQ2 SCORE: 0
SUM OF ALL RESPONSES TO PHQ QUESTIONS 1-9: 0
5. POOR APPETITE OR OVEREATING: 0 - NOT AT ALL

## 2025-06-06 NOTE — ASSESSMENT & PLAN NOTE
Continue Vraylar 4.5mg po qD for Bipolar I  Continue Trazodone 150mg po qHS for insomnia  Encouraged consistent use of CPAP

## 2025-06-06 NOTE — ASSESSMENT & PLAN NOTE
PTSD symptoms well managed with continued CBT skills practices, pt demonstrates markedly improved self-efficacy, affect regulation. Main symptoms of PTSD resolved.  Continue Vraylar 4.5mg po qD for Bipolar I  Continue Trazodone 150mg po qHS for insomnia  Encouraged consistent use of CPAP

## 2025-06-06 NOTE — PSYCHOTHERAPY
[facility] PSYCHOTHERAPY NOTE    Today's Date: 06/06/2025  Supervising Attending: Laurence Mercado M.D.     CASE CONCEPTUALIZATION   Therapeutic Intervention(s): Psychoeducation regarding Bipolar I and menopause and Self-care skills     Description of Presenting Problem:    Theory for how/why the problem has arisen (BioPsychoSocial Model)    Barriers:     ASSESSMENT  First sentence could describe how you think pt's symptoms have changed and why. Second sentence could describe what you think pt needs to continue making change.      TREATMENT PLAN  Goal(s) of Treatment:     Plan to Achieve Goal(s):      Progress toward Treatment Goals: Significant improvement     Plan:  - Target for next session: Continue self-care

## 2025-06-06 NOTE — PROGRESS NOTES
"[facility] Outpatient Psychiatric Follow Up Note  Evaluation completed by: Adam Kaye M.D.   Date of Service: 06/06/2025  Appointment type: in-office appointment.  Attending:  Laurence Mercado M.D.   Information below was collected from: patient    CHIEF COMPLIANT:  Medication Management (For Bipolar I, PTSD and KEEGAN.)    Pt was last seen by Dr. Payne on 5/2/2025:    HPI:   Shae Carrasco is a 49 y.o. old female with hx of Bipolar I, PTSD, KEEGAN and CONSTANCE who presents today for regularly scheduled follow up for assessment of Medication Management (For Bipolar I, PTSD and KEEGAN.)    Pt was previously seen by Dr. Payne from Nov 2022 - Mar 2025 for med management. She was previous seen by me for therapy from Aug 2024 (Initial psych eval 8/2/2024) - Sep 2024, successfully graduated from the therapy clinic.     #Bipolar I   #PTSD  #KEEGAN  #CONSTANCE  #Perimenopause  - Currently on Vraylar 4.5mg po qD for Bipolar I and Trazodone 150mg po qHS for sleep.      - Pt reports improved moods, strong motivation in doing things, and lightly improved energy level since increasing the dose of Vraylar, she is no longer feeling depressed. She reports everything in her life is going \"very well\" at the moment, she continues to practice self-care skills and CBT skills she learned when attending therapy with me.   - Pt is still using CPAP for her CONSTANCE on the lowest setting which has been effective  - Sleep has been okay, it didn't become worse with the increased dose of Vraylar. Trazodone 150mg has been working well for her. Pt denies any difficulty with onset of sleep, she wakes up once at night for water/bathroom and can immediately fall back to sleep.   - Pt reports she has been experiencing some irritability and hot flashes related to perimenopause.     We discussed the newest evidence of hormonal replacement therapy combined with SGA/mood stabilizer can reduce relapse and improve mood regulation in individual with bipolar " "going through menopause. We also discussed potentially using clonidine as an adjunct in managing irritability, hot flashes and insomnia related to menopause. Discussed plan below and pt is agreeable:    Continue Vraylar 4.5mg po qD for Bipolar I  Continue Trazodone 150mg po qHS for insomnia  Encouraged consistent use of CPAP    THERAPY  Type of session:Medication management with psychotherapy  Session start time: 8:46 am  Session stop time: 9:15 am  Length of time spent face to face with patient: 29 min  Persons in attendance:Patient    Therapeutic Intervention(s): Psychoeducation regarding bipolar and perimenopause     Treatment Goal(s)/Objective(s) addressed: Tx plan:  Utilize learned skills to manage mood and emotional suffering more effectively  Learn to successfully challenge & change distortions in thinking  Learn to ameliorate effects of anxiety on life and functioning  Increase behaviors of self-compassion and self-care    Progress toward Treatment Goals: Significant improvement     PSYCHIATRIC REVIEW OF SYSTEMS:current symptoms as reported by pt.  Depression: Denies depressed mood or anhedonia  Carli: Patient denies any change in mood, increased energy, or marked irritability  Anxiety/Panic Attacks: Denies any anxiety associated symptoms  Trauma: Patient reports no signs or symptoms indicative of PTSD  Psychosis: Patient reports no signs or symptoms indicative of psychosis  ADHD: None      CURRENT MEDICATIONS  Current Medications[1]     REVIEW OF SYSTEMS   ROS  Neurologic: no tics, tremors, dyskinesias. The patient denies dizziness, syncope, falls. Ambulates independently    PAST MEDICAL HISTORY  Past Medical History[2]  Allergies[3]  Past Surgical History[4]   History reviewed. No pertinent family history.  Social History[5]  Past Surgical History[6]    PSYCHIATRIC EXAMINATION   BP 95/62 (BP Location: Right arm, Patient Position: Sitting, BP Cuff Size: Adult)   Pulse 77   Ht 1.6 m (5' 3\")   Wt 58.4 kg " "(128 lb 12.8 oz)   LMP 12/01/2010   BMI 22.82 kg/m²   Musculoskeletal: No abnormal movements noted  Appearance: well-developed, well-nourished, appears stated age, fair hygiene, no apparent distress, and appropriately dressed, cooperative, engaged, friendly, pleasant, and poor eye contact  Thought Process:  linear, coherent, goal-oriented, and organized  Abnormal or Psychotic Thoughts: No evidence of auditory or visual hallucinations, and no overt delusions noted  Speech: regular rate, rhythm, volume, tone, and syntax, coherent, and normal tone  Mood: \"good\"  Affect: euthymic, congruent with mood, and full range, flexible  SI/HI: Denies SI and HI  Orientation: alert and oriented  Recent and Remote Memory: no gross impairment in immediate, recent, or remote memory  Attention Span and Concentration: Grossly intact  Insight/Judgement into symptoms: good  Neurological Testing (MSSE Score and/or clock drawing): MMSE not performed during this encounter    SCREENINGS:      5/8/2024     9:30 AM 8/2/2024     3:00 PM 6/6/2025     8:45 AM   Depression Screen (PHQ-2/PHQ-9)   PHQ-2 Total Score 2 0 0   PHQ-9 Total Score 9  0         5/8/2024    10:07 AM 8/2/2024     3:34 PM 6/6/2025     8:56 AM   KEEGAN 7   KEEGAN-7 Total Score 9 8 0       PREVENTATIVE CARE  Medication Monitoring:   PCP: Dr. Dias is [place]  Labs ordered last visit as follows/most recent labs 6/3/2024 from [facility] :  TSH: 2.7  CHOLESTEROL, TOTAL: 124  Na: 137  BUN: 13  Cr: 0.64  AST: 49  ALT: 39 (H)  HA1C: 5.2  CBC: ALL WNL     Vitals Encounter Vitals  Blood Pressure: 95/62  Pulse: 77  Pulse Oximetry:  (Unable to obtain due to fake finger nails.)  Weight: 58.4 kg (128 lb 12.8 oz)  Height: 160 cm (5' 3\")  BMI (Calculated): 22.82 Discussed side effects including headache, drowsiness, dizziness, sedation, dry mouth, constipation, weight gain, orthostatic hypotension, hypertension, dyslipidemia, hyperglycemia, diabetes mellitus, akathisia/restlessness, " tremors, muscle rigidity, acute dystonia, tardive dyskinesia etc.     [place]  records   reviewed.  No concerns about misuse of controlled substance.    CURRENT RISK ASSESSMENT       Suicide: Low       Homicide: Low       Self-Harm: Low       Relapse: Low       Crisis Safety Plan Reviewed Not Indicated    ASSESSMENT/DIAGNOSES/PLAN  Problem List Items Addressed This Visit          Psychiatry Problems    Bipolar I disorder in remission (HCC) - Primary    Continue Vraylar 4.5mg po qD for Bipolar I  Continue Trazodone 150mg po qHS for insomnia  Encouraged consistent use of CPAP         PTSD (post-traumatic stress disorder)    PTSD symptoms well managed with continued CBT skills practices, pt demonstrates markedly improved self-efficacy, affect regulation. Main symptoms of PTSD resolved.  Continue Vraylar 4.5mg po qD for Bipolar I  Continue Trazodone 150mg po qHS for insomnia  Encouraged consistent use of CPAP            Other    Perimenopause    Continue Vraylar 4.5mg po qD for Bipolar I  Continue Trazodone 150mg po qHS for insomnia  Encouraged consistent use of CPAP         CONSTANCE on CPAP    Continue Vraylar 4.5mg po qD for Bipolar I  Continue Trazodone 150mg po qHS for insomnia  Encouraged consistent use of CPAP               Medication options, alternatives (including no medications) and medication risks/benefits/side effects were discussed in detail.  The patient was advised to call, message clinician on [employer], or come in to the clinic if symptoms worsen or if questions/issues regarding their medications arise.  The patient verbalized understanding and agreement.    The patient was educated to call 911, call the suicide hotline, or go to the local ER if having thoughts of suicide or homicide.  The patient verbalized understanding and agreement.   The proposed treatment plan was discussed with the patient who was provided the opportunity to ask questions and make suggestions regarding alternative treatment. Patient  verbalized understanding and expressed agreement with the plan.      Return in about 1 month (around 7/6/2025).      This appointment was supervised by attending psychiatrist, Laurence Mercado M.D. , who agrees with assessment and treatment plan.  See attending attestation for more details.          [1]   Current Outpatient Medications:     Cariprazine HCl (VRAYLAR) 4.5 MG Cap, Take 1 Capsule by mouth every day., Disp: 30 Capsule, Rfl: 3    traZODone (DESYREL) 150 MG Tab, Take 1 Tablet by mouth every evening for 180 days., Disp: 90 Tablet, Rfl: 1    traZODone (DESYREL) 50 MG Tab, Take 2 1/2 tablets (125 mg) by mouth at bedtime.  If needed may increase to take 3 tablets (150 mg) at bedtime, Disp: 90 Tablet, Rfl: 1    metformin (GLUCOPHAGE) 1000 MG tablet, TAKE 1 TABLET BY MOUTH TWICE DAILY, Disp: 60 Tablet, Rfl: 3    meloxicam (MOBIC) 15 MG tablet, Take 1 Tablet by mouth every day., Disp: 30 Tablet, Rfl: 0    albuterol 108 (90 Base) MCG/ACT Aero Soln inhalation aerosol, inhale 2 puff by inhalation route  every 4 - 6 hours as needed as needed, Disp: , Rfl:     ibuprofen (MOTRIN) 600 MG TABS, Take 600 mg by mouth every 6 hours as needed., Disp: , Rfl:     Albuterol (VENTOLIN INH), Inhale  by mouth., Disp: , Rfl:   [2]   Past Medical History:  Diagnosis Date    Other specified symptom associated with female genital organs     pelvic pain    Pain     pelvic pain    Psychiatric disorder    [3] No Known Allergies  [4]   Past Surgical History:  Procedure Laterality Date    LAPAROSCOPIC SUPRACERVICAL HYSTERECTOMY  6/8/2011    Performed by KLAUDIA DREW at SURGERY SAME DAY [facility]    DILATION AND CURETTAGE  2004   [5]   Social History  Socioeconomic History    Marital status: Single   Tobacco Use    Smoking status: Former     Current packs/day: 1.00     Average packs/day: 1 pack/day for 13.0 years (13.0 ttl pk-yrs)     Types: Cigarettes    Smokeless tobacco: Never   Substance and Sexual Activity    Alcohol use: No     Drug use: No   [6]   Past Surgical History:  Procedure Laterality Date    LAPAROSCOPIC SUPRACERVICAL HYSTERECTOMY  6/8/2011    Performed by KLAUDIA DERW at SURGERY SAME DAY [facility]    DILATION AND CURETTAGE  2004

## 2025-07-18 ENCOUNTER — OFFICE VISIT (OUTPATIENT)
Dept: BEHAVIORAL HEALTH | Facility: PSYCHIATRIC FACILITY | Age: 50
End: 2025-07-18
Payer: COMMERCIAL

## 2025-07-18 VITALS
BODY MASS INDEX: 23.42 KG/M2 | OXYGEN SATURATION: 100 % | SYSTOLIC BLOOD PRESSURE: 104 MMHG | WEIGHT: 132.2 LBS | DIASTOLIC BLOOD PRESSURE: 66 MMHG | HEIGHT: 63 IN | HEART RATE: 83 BPM

## 2025-07-18 DIAGNOSIS — F43.10 PTSD (POST-TRAUMATIC STRESS DISORDER): ICD-10-CM

## 2025-07-18 DIAGNOSIS — F31.70 BIPOLAR I DISORDER IN REMISSION (HCC): Primary | ICD-10-CM

## 2025-07-18 PROCEDURE — 99999 PR NO CHARGE: CPT

## 2025-07-18 RX ORDER — TRAZODONE HYDROCHLORIDE 150 MG/1
150 TABLET ORAL NIGHTLY
Qty: 90 TABLET | Refills: 1 | Status: SHIPPED | OUTPATIENT
Start: 2025-07-18 | End: 2026-01-14

## 2025-07-18 ASSESSMENT — ENCOUNTER SYMPTOMS
NERVOUS/ANXIOUS: 0
DEPRESSION: 0
INSOMNIA: 0

## 2025-07-18 NOTE — PROGRESS NOTES
"[facility] Outpatient Psychiatric Follow Up Note  Evaluation completed by: Adam Kaye M.D.   Date of Service: 07/18/2025  Appointment type: in-office appointment.  Attending:  Laurence Mercado M.D.   Information below was collected from: patient    CHIEF COMPLIANT:  Medication Management (PTSD and Bipolar I)    Last seen by me on  6/6/2025    Continue Vraylar 4.5mg po qD for Bipolar I  Continue Trazodone 150mg po qHS for insomnia  Encouraged consistent use of CPAP    HPI:   Shae Carrasco is a 49 y.o. old female who presents today for regularly scheduled follow up for assessment of Medication Management (PTSD and Bipolar I)    Pt reports everything is going great.  Her mood is \"great\", denies any anxiety or paranoia.  She continues to tolerate Vraylar 4.5 mg po qD with no reported side effect. Pt had just done with her review yesterday at work and she is happy to be over the stress. Pt is getting 6-8 hrs of sleep, feeling well rested. She does have increased hunger lately after stopping Ozempic, but pt is still eating healthy on low-carb diet to keep her weight down.     Pt denies any irritability, SI/ or HI. Perimenopausal symptoms is minimal and tolerable.  Pt has anemia due to celiac disease and poor iron absorption. Pt reports slight fatigue, but she is getting it treated.     Discussed plan below and pt is agreeable:  1.Continue Vraylar 4.5mg po qD for Bipolar I  2.Continue Trazodone 150mg po qHS for insomnia  3.Encouraged consistent use of CPAP      PSYCHIATRIC REVIEW OF SYSTEMS:current symptoms as reported by pt.  Depression: Denies depressed mood or anhedonia  Carli: Patient denies any change in mood, increased energy, or marked irritability  Anxiety/Panic Attacks: Denies any anxiety associated symptoms  Trauma: Patient reports no signs or symptoms indicative of PTSD  Psychosis: Patient reports no signs or symptoms indicative of psychosis  ADHD: Pt denies      REVIEW OF SYSTEMS   Review of " "Systems   Psychiatric/Behavioral:  Negative for depression and suicidal ideas. The patient is not nervous/anxious and does not have insomnia.        CURRENT MEDICATIONS  Current Medications[1]     PAST MEDICAL HISTORY  Past Medical History[2]  Allergies[3]  Past Surgical History[4]   History reviewed. No pertinent family history.  Social History[5]  Past Surgical History[6]    PSYCHIATRIC EXAMINATION   /66 (BP Location: Right arm, Patient Position: Sitting, BP Cuff Size: Adult)   Pulse 83   Ht 1.6 m (5' 3\")   Wt 60 kg (132 lb 3.2 oz)   LMP 12/01/2010   SpO2 100%   BMI 23.42 kg/m²   Musculoskeletal: No abnormal movements noted  Appearance: well-developed, well-nourished, appears stated age, fair hygiene, no apparent distress, and appropriately dressed, cooperative, engaged, friendly, pleasant, and good eye contact  Thought Process:  linear, coherent, goal-oriented, and organized  Abnormal or Psychotic Thoughts: No evidence of auditory or visual hallucinations, and no overt delusions noted  Speech: regular rate, rhythm, volume, tone, and syntax, coherent, normal tone, and spontaneous  Mood: \"Everything is going great!\"  Affect: euthymic, congruent with mood, and full range.  SI/HI: Denies SI and HI  Orientation: alert and oriented  Recent and Remote Memory: no gross impairment in immediate, recent, or remote memory  Attention Span and Concentration: Grossly intact  Insight/Judgement into symptoms: good  Neurological Testing (MSSE Score and/or clock drawing): MMSE not performed during this encounter    SCREENINGS:      5/8/2024     9:30 AM 8/2/2024     3:00 PM 6/6/2025     8:45 AM   Depression Screen (PHQ-2/PHQ-9)   PHQ-2 Total Score 2 0 0   PHQ-9 Total Score 9  0         5/8/2024    10:07 AM 8/2/2024     3:34 PM 6/6/2025     8:56 AM   KEEGAN 7   KEEGAN-7 Total Score 9 8 0       Medication Monitoring:     AIMS Abnormal Involuntary Movements Scale (AIMS)  Facial and Oral Movements:0      Extremity Movements:0    "   Trunk Movements:0     Global Judgements: 0      Dental Status:0      Overall:0    Discussed side effects including headache, drowsiness, dizziness, sedation, dry mouth, constipation, weight gain, orthostatic hypotension, hypertension, dyslipidemia, hyperglycemia, diabetes mellitus, akathisia/restlessness, tremors, muscle rigidity, acute dystonia, tardive dyskinesia etc.       CURRENT RISK ASSESSMENT       Suicide: Low       Homicide: Low       Self-Harm: Low       Relapse: Low       Crisis Safety Plan Reviewed Not Indicated    ASSESSMENT/PLAN  Problem List Items Addressed This Visit          Psychiatry Problems    Bipolar I disorder in remission (HCC) - Primary    Relevant Medications    traZODone (DESYREL) 150 MG Tab    PTSD (post-traumatic stress disorder)    Relevant Medications    traZODone (DESYREL) 150 MG Tab          Medication options, alternatives (including no medications) and medication risks/benefits/side effects were discussed in detail.  The patient was advised to call, message clinician on [employer], or come in to the clinic if symptoms worsen or if questions/issues regarding their medications arise.  The patient verbalized understanding and agreement.    The patient was educated to call 911, call the suicide hotline, or go to the local ER if having thoughts of suicide or homicide.  The patient verbalized understanding and agreement.   The proposed treatment plan was discussed with the patient who was provided the opportunity to ask questions and make suggestions regarding alternative treatment. Patient verbalized understanding and expressed agreement with the plan.      Return in about 3 months (around 10/18/2025).      This appointment was supervised by attending psychiatrist, Laurence Mercado M.D. , who agrees with assessment and treatment plan.  See attending attestation for more details.            [1]   Current Outpatient Medications:     traZODone (DESYREL) 150 MG Tab, Take 1 Tablet by mouth  every evening for 180 days., Disp: 90 Tablet, Rfl: 1    Cariprazine HCl (VRAYLAR) 4.5 MG Cap, Take 1 Capsule by mouth every day., Disp: 30 Capsule, Rfl: 3    traZODone (DESYREL) 50 MG Tab, Take 2 1/2 tablets (125 mg) by mouth at bedtime.  If needed may increase to take 3 tablets (150 mg) at bedtime, Disp: 90 Tablet, Rfl: 1    metformin (GLUCOPHAGE) 1000 MG tablet, TAKE 1 TABLET BY MOUTH TWICE DAILY, Disp: 60 Tablet, Rfl: 3    meloxicam (MOBIC) 15 MG tablet, Take 1 Tablet by mouth every day., Disp: 30 Tablet, Rfl: 0    albuterol 108 (90 Base) MCG/ACT Aero Soln inhalation aerosol, inhale 2 puff by inhalation route  every 4 - 6 hours as needed as needed, Disp: , Rfl:     ibuprofen (MOTRIN) 600 MG TABS, Take 600 mg by mouth every 6 hours as needed., Disp: , Rfl:     Albuterol (VENTOLIN INH), Inhale  by mouth., Disp: , Rfl:   [2]   Past Medical History:  Diagnosis Date    Other specified symptom associated with female genital organs     pelvic pain    Pain     pelvic pain    Psychiatric disorder    [3] No Known Allergies  [4]   Past Surgical History:  Procedure Laterality Date    LAPAROSCOPIC SUPRACERVICAL HYSTERECTOMY  6/8/2011    Performed by KLAUDIA DREW at SURGERY SAME DAY [facility]    DILATION AND CURETTAGE  2004   [5]   Social History  Socioeconomic History    Marital status: Single   Tobacco Use    Smoking status: Former     Current packs/day: 1.00     Average packs/day: 1 pack/day for 13.0 years (13.0 ttl pk-yrs)     Types: Cigarettes    Smokeless tobacco: Never   Substance and Sexual Activity    Alcohol use: No    Drug use: No   [6]   Past Surgical History:  Procedure Laterality Date    LAPAROSCOPIC SUPRACERVICAL HYSTERECTOMY  6/8/2011    Performed by KLAUDIA DREW at SURGERY SAME DAY [facility]    DILATION AND CURETTAGE  2004